# Patient Record
Sex: MALE | Race: WHITE | NOT HISPANIC OR LATINO | Employment: UNEMPLOYED | URBAN - METROPOLITAN AREA
[De-identification: names, ages, dates, MRNs, and addresses within clinical notes are randomized per-mention and may not be internally consistent; named-entity substitution may affect disease eponyms.]

---

## 2017-03-27 ENCOUNTER — HISTORICAL (OUTPATIENT)
Dept: ADMINISTRATIVE | Facility: HOSPITAL | Age: 57
End: 2017-03-27

## 2017-03-27 LAB
ABS NEUT (OLG): 5.23 X10(3)/MCL (ref 2.1–9.2)
ALBUMIN SERPL-MCNC: 4 GM/DL (ref 3.4–5)
ALBUMIN/GLOB SERPL: 1 {RATIO}
ALP SERPL-CCNC: 73 UNIT/L (ref 20–120)
ALT SERPL-CCNC: 52 UNIT/L
APPEARANCE, UA: CLEAR
AST SERPL-CCNC: 37 UNIT/L
BACTERIA #/AREA URNS AUTO: ABNORMAL /[HPF]
BASOPHILS # BLD AUTO: 0.06 X10(3)/MCL
BASOPHILS NFR BLD AUTO: 1 % (ref 0–1)
BILIRUB SERPL-MCNC: 0.9 MG/DL
BILIRUB UR QL STRIP: NEGATIVE
BILIRUBIN DIRECT+TOT PNL SERPL-MCNC: 0.1 MG/DL
BILIRUBIN DIRECT+TOT PNL SERPL-MCNC: 0.8 MG/DL
BUN SERPL-MCNC: 17 MG/DL (ref 7–25)
CALCIUM SERPL-MCNC: 9.5 MG/DL (ref 8.4–10.3)
CHLORIDE SERPL-SCNC: 100 MMOL/L (ref 96–110)
CHOLEST SERPL-MCNC: 267 MG/DL
CHOLEST/HDLC SERPL: 7.2 {RATIO} (ref 0–5)
CO2 SERPL-SCNC: 35 MMOL/L (ref 24–32)
COLOR UR: YELLOW
CREAT SERPL-MCNC: 1.13 MG/DL (ref 0.7–1.4)
CREAT UR-MCNC: 309.1 MG/DL
EOSINOPHIL # BLD AUTO: 0.36 X10(3)/MCL
EOSINOPHIL NFR BLD AUTO: 4 % (ref 0–5)
ERYTHROCYTE [DISTWIDTH] IN BLOOD BY AUTOMATED COUNT: 14.8 % (ref 11.5–14.5)
EST. AVERAGE GLUCOSE BLD GHB EST-MCNC: 143 MG/DL
GLOBULIN SER-MCNC: 3.7 GM/ML (ref 2.3–3.5)
GLUCOSE (UA): NORMAL
GLUCOSE SERPL-MCNC: 147 MG/DL (ref 65–99)
HBA1C MFR BLD: 6.6 % (ref 4.7–5.6)
HCT VFR BLD AUTO: 56.2 % (ref 40–51)
HDLC SERPL-MCNC: 37 MG/DL
HGB BLD-MCNC: 18.5 GM/DL (ref 13.5–17.5)
HGB UR QL STRIP: NEGATIVE
HYALINE CASTS #/AREA URNS LPF: ABNORMAL /[LPF]
IMM GRANULOCYTES # BLD AUTO: 0.14 10*3/UL
IMM GRANULOCYTES NFR BLD AUTO: 2 %
KETONES UR QL STRIP: NEGATIVE
LDLC SERPL CALC-MCNC: 160 MG/DL (ref 0–130)
LEUKOCYTE ESTERASE UR QL STRIP: 25 LEU/UL
LYMPHOCYTES # BLD AUTO: 2.76 X10(3)/MCL
LYMPHOCYTES NFR BLD AUTO: 30 % (ref 15–40)
MCH RBC QN AUTO: 29.2 PG (ref 26–34)
MCHC RBC AUTO-ENTMCNC: 32.9 GM/DL (ref 31–37)
MCV RBC AUTO: 88.8 FL (ref 80–100)
MICROALBUMIN UR-MCNC: 26.6 MG/L (ref 0–19)
MICROALBUMIN/CREAT RATIO PNL UR: 8.6 MCG/MG CR (ref 0–29)
MONOCYTES # BLD AUTO: 0.76 X10(3)/MCL
MONOCYTES NFR BLD AUTO: 8 % (ref 4–12)
NEUTROPHILS # BLD AUTO: 5.23 X10(3)/MCL
NEUTROPHILS NFR BLD AUTO: 56 % (ref 43–75)
NITRITE UR QL STRIP: NEGATIVE
PH UR STRIP: 6.5 [PH] (ref 4.5–8)
PLATELET # BLD AUTO: 227 X10(3)/MCL (ref 130–400)
PMV BLD AUTO: 9.1 FL (ref 7.4–10.4)
POTASSIUM SERPL-SCNC: 4.2 MMOL/L (ref 3.6–5.2)
PROT SERPL-MCNC: 7.7 GM/DL (ref 6–8)
PROT UR QL STRIP: 20 MG/DL
RBC # BLD AUTO: 6.33 X10(6)/MCL (ref 4.5–5.9)
RBC #/AREA URNS AUTO: ABNORMAL /[HPF]
SODIUM SERPL-SCNC: 142 MMOL/L (ref 135–146)
SP GR UR STRIP: 1.02 (ref 1–1.03)
SQUAMOUS #/AREA URNS LPF: ABNORMAL /[LPF]
TRIGL SERPL-MCNC: 348 MG/DL
UROBILINOGEN UR STRIP-ACNC: 2 MG/DL
VLDLC SERPL CALC-MCNC: 70 MG/DL
WBC # SPEC AUTO: 9.3 X10(3)/MCL (ref 4.5–11)
WBC #/AREA URNS AUTO: ABNORMAL /HPF

## 2017-04-11 ENCOUNTER — HISTORICAL (OUTPATIENT)
Dept: INTERNAL MEDICINE | Facility: CLINIC | Age: 57
End: 2017-04-11

## 2018-03-05 ENCOUNTER — HISTORICAL (OUTPATIENT)
Dept: ADMINISTRATIVE | Facility: HOSPITAL | Age: 58
End: 2018-03-05

## 2018-03-05 LAB
ABS NEUT (OLG): 6.1 X10(3)/MCL (ref 2.1–9.2)
ALBUMIN SERPL-MCNC: 3.6 GM/DL (ref 3.4–5)
ALBUMIN/GLOB SERPL: 1 RATIO (ref 1–2)
ALP SERPL-CCNC: 94 UNIT/L (ref 45–117)
ALT SERPL-CCNC: 42 UNIT/L (ref 12–78)
AST SERPL-CCNC: 28 UNIT/L (ref 15–37)
BASOPHILS # BLD AUTO: 0.08 X10(3)/MCL
BASOPHILS NFR BLD AUTO: 1 %
BILIRUB SERPL-MCNC: 0.4 MG/DL (ref 0.2–1)
BILIRUBIN DIRECT+TOT PNL SERPL-MCNC: 0.2 MG/DL
BILIRUBIN DIRECT+TOT PNL SERPL-MCNC: 0.2 MG/DL
BUN SERPL-MCNC: 12 MG/DL (ref 7–18)
CALCIUM SERPL-MCNC: 9 MG/DL (ref 8.5–10.1)
CHLORIDE SERPL-SCNC: 104 MMOL/L (ref 98–107)
CHOLEST SERPL-MCNC: 128 MG/DL
CHOLEST/HDLC SERPL: 3.4 {RATIO} (ref 0–5)
CO2 SERPL-SCNC: 30 MMOL/L (ref 21–32)
CREAT SERPL-MCNC: 1.1 MG/DL (ref 0.6–1.3)
EOSINOPHIL # BLD AUTO: 0.15 X10(3)/MCL
EOSINOPHIL NFR BLD AUTO: 2 %
ERYTHROCYTE [DISTWIDTH] IN BLOOD BY AUTOMATED COUNT: 15 % (ref 11.5–14.5)
EST. AVERAGE GLUCOSE BLD GHB EST-MCNC: 143 MG/DL
GLOBULIN SER-MCNC: 3.5 GM/ML (ref 2.3–3.5)
GLUCOSE SERPL-MCNC: 92 MG/DL (ref 74–106)
HBA1C MFR BLD: 6.6 % (ref 4.2–6.3)
HCT VFR BLD AUTO: 53.2 % (ref 40–51)
HDLC SERPL-MCNC: 38 MG/DL
HGB BLD-MCNC: 17.4 GM/DL (ref 13.5–17.5)
IMM GRANULOCYTES # BLD AUTO: 0.14 10*3/UL
IMM GRANULOCYTES NFR BLD AUTO: 1 %
LDLC SERPL CALC-MCNC: 54 MG/DL (ref 0–130)
LYMPHOCYTES # BLD AUTO: 2.42 X10(3)/MCL
LYMPHOCYTES NFR BLD AUTO: 25 % (ref 13–40)
MCH RBC QN AUTO: 29.8 PG (ref 26–34)
MCHC RBC AUTO-ENTMCNC: 32.7 GM/DL (ref 31–37)
MCV RBC AUTO: 91.3 FL (ref 80–100)
MONOCYTES # BLD AUTO: 0.93 X10(3)/MCL
MONOCYTES NFR BLD AUTO: 10 % (ref 4–12)
NEUTROPHILS # BLD AUTO: 6.1 X10(3)/MCL
NEUTROPHILS NFR BLD AUTO: 62 X10(3)/MCL
PLATELET # BLD AUTO: 244 X10(3)/MCL (ref 130–400)
PMV BLD AUTO: 9.7 FL (ref 7.4–10.4)
POTASSIUM SERPL-SCNC: 4 MMOL/L (ref 3.5–5.1)
PROT SERPL-MCNC: 7.1 GM/DL (ref 6.4–8.2)
RBC # BLD AUTO: 5.83 X10(6)/MCL (ref 4.5–5.9)
SODIUM SERPL-SCNC: 141 MMOL/L (ref 136–145)
TRIGL SERPL-MCNC: 181 MG/DL
VLDLC SERPL CALC-MCNC: 36 MG/DL
WBC # SPEC AUTO: 9.8 X10(3)/MCL (ref 4.5–11)

## 2018-03-08 ENCOUNTER — HISTORICAL (OUTPATIENT)
Dept: INTERNAL MEDICINE | Facility: CLINIC | Age: 58
End: 2018-03-08

## 2018-03-08 LAB
APPEARANCE, UA: CLEAR
BACTERIA #/AREA URNS AUTO: ABNORMAL /[HPF]
BILIRUB UR QL STRIP: NEGATIVE
COLOR UR: YELLOW
GLUCOSE (UA): NORMAL
HGB UR QL STRIP: NEGATIVE
HYALINE CASTS #/AREA URNS LPF: ABNORMAL /[LPF]
KETONES UR QL STRIP: ABNORMAL
LEUKOCYTE ESTERASE UR QL STRIP: 75 LEU/UL
NITRITE UR QL STRIP: NEGATIVE
PH UR STRIP: 5.5 [PH] (ref 4.5–8)
PROT UR QL STRIP: 10 MG/DL
RBC #/AREA URNS AUTO: ABNORMAL /[HPF]
SP GR UR STRIP: 1.01 (ref 1–1.03)
SQUAMOUS #/AREA URNS LPF: ABNORMAL /[LPF]
UROBILINOGEN UR STRIP-ACNC: 2 MG/DL
WBC #/AREA URNS AUTO: ABNORMAL /HPF

## 2018-03-12 ENCOUNTER — HISTORICAL (OUTPATIENT)
Dept: SLEEP MEDICINE | Facility: HOSPITAL | Age: 58
End: 2018-03-12

## 2018-05-11 ENCOUNTER — HISTORICAL (OUTPATIENT)
Dept: RADIOLOGY | Facility: HOSPITAL | Age: 58
End: 2018-05-11

## 2018-09-20 ENCOUNTER — HISTORICAL (OUTPATIENT)
Dept: INTERNAL MEDICINE | Facility: CLINIC | Age: 58
End: 2018-09-20

## 2018-09-25 ENCOUNTER — HISTORICAL (OUTPATIENT)
Dept: RADIOLOGY | Facility: HOSPITAL | Age: 58
End: 2018-09-25

## 2019-05-22 ENCOUNTER — HISTORICAL (OUTPATIENT)
Dept: ADMINISTRATIVE | Facility: HOSPITAL | Age: 59
End: 2019-05-22

## 2019-05-22 LAB
ABS NEUT (OLG): 8.79 X10(3)/MCL (ref 2.1–9.2)
BASOPHILS # BLD AUTO: 0.05 X10(3)/MCL
BASOPHILS NFR BLD AUTO: 0 %
EOSINOPHIL # BLD AUTO: 0.14 10*3/UL
EOSINOPHIL NFR BLD AUTO: 1 %
ERYTHROCYTE [DISTWIDTH] IN BLOOD BY AUTOMATED COUNT: 15.1 % (ref 11.5–14.5)
EST. AVERAGE GLUCOSE BLD GHB EST-MCNC: 148 MG/DL
HBA1C MFR BLD: 6.8 % (ref 4.2–6.3)
HCT VFR BLD AUTO: 52.7 % (ref 40–51)
HGB BLD-MCNC: 16.4 GM/DL (ref 13.5–17.5)
IMM GRANULOCYTES # BLD AUTO: 0.11 10*3/UL
IMM GRANULOCYTES NFR BLD AUTO: 1 %
LYMPHOCYTES # BLD AUTO: 2.26 X10(3)/MCL
LYMPHOCYTES NFR BLD AUTO: 18 % (ref 13–40)
MCH RBC QN AUTO: 27.6 PG (ref 26–34)
MCHC RBC AUTO-ENTMCNC: 31.1 GM/DL (ref 31–37)
MCV RBC AUTO: 88.6 FL (ref 80–100)
MONOCYTES # BLD AUTO: 0.95 X10(3)/MCL
MONOCYTES NFR BLD AUTO: 8 % (ref 4–12)
NEUTROPHILS # BLD AUTO: 8.79 X10(3)/MCL
NEUTROPHILS NFR BLD AUTO: 72 X10(3)/MCL
PLATELET # BLD AUTO: 297 X10(3)/MCL (ref 130–400)
PMV BLD AUTO: 9.6 FL (ref 7.4–10.4)
RBC # BLD AUTO: 5.95 X10(6)/MCL (ref 4.5–5.9)
WBC # SPEC AUTO: 12.3 X10(3)/MCL (ref 4.5–11)

## 2019-05-30 ENCOUNTER — HISTORICAL (OUTPATIENT)
Dept: RESPIRATORY THERAPY | Facility: HOSPITAL | Age: 59
End: 2019-05-30

## 2019-06-26 ENCOUNTER — HISTORICAL (OUTPATIENT)
Dept: SLEEP MEDICINE | Facility: HOSPITAL | Age: 59
End: 2019-06-26

## 2019-10-01 ENCOUNTER — HOSPITAL ENCOUNTER (OUTPATIENT)
Dept: MEDSURG UNIT | Facility: HOSPITAL | Age: 59
End: 2019-10-02
Attending: INTERNAL MEDICINE | Admitting: INTERNAL MEDICINE

## 2019-10-01 LAB
ALBUMIN SERPL-MCNC: 3.8 GM/DL (ref 3.4–5)
ALBUMIN/GLOB SERPL: 1 RATIO (ref 1.1–2)
ALP SERPL-CCNC: 108 UNIT/L (ref 45–117)
ALT SERPL-CCNC: 34 UNIT/L (ref 12–78)
AMPHET UR QL SCN: NEGATIVE
APPEARANCE, UA: ABNORMAL
AST SERPL-CCNC: 48 UNIT/L (ref 15–37)
BACTERIA #/AREA URNS AUTO: ABNORMAL /[HPF]
BARBITURATE SCN PRESENT UR: NEGATIVE
BASOPHILS NFR BLD MANUAL: 0 %
BENZODIAZ UR QL SCN: NEGATIVE
BILIRUB SERPL-MCNC: 0.6 MG/DL (ref 0.2–1)
BILIRUB UR QL STRIP: 0.5 MG/DL
BILIRUBIN DIRECT+TOT PNL SERPL-MCNC: 0.2 MG/DL (ref 0–0.2)
BILIRUBIN DIRECT+TOT PNL SERPL-MCNC: 0.4 MG/DL
BUN SERPL-MCNC: 56 MG/DL (ref 7–18)
CALCIUM SERPL-MCNC: 9.5 MG/DL (ref 8.5–10.1)
CANNABINOIDS UR QL SCN: POSITIVE
CHLORIDE SERPL-SCNC: 107 MMOL/L (ref 98–107)
CO2 SERPL-SCNC: 25 MMOL/L (ref 21–32)
COCAINE UR QL SCN: NEGATIVE
COLOR UR: YELLOW
CREAT SERPL-MCNC: 3 MG/DL (ref 0.6–1.3)
EOSINOPHIL NFR BLD MANUAL: 0 %
ERYTHROCYTE [DISTWIDTH] IN BLOOD BY AUTOMATED COUNT: 14.6 % (ref 11.5–14.5)
ETHANOL SERPL-MCNC: <3 MG/DL
GLOBULIN SER-MCNC: 3.7 GM/ML (ref 2.3–3.5)
GLUCOSE (UA): 30 MG/DL
GLUCOSE SERPL-MCNC: 142 MG/DL (ref 74–106)
GRANULOCYTES NFR BLD MANUAL: 66 % (ref 43–75)
HCT VFR BLD AUTO: 45.9 % (ref 40–51)
HGB BLD-MCNC: 15.6 GM/DL (ref 13.5–17.5)
HGB UR QL STRIP: NEGATIVE
HYALINE CASTS #/AREA URNS LPF: ABNORMAL /[LPF]
KETONES UR QL STRIP: ABNORMAL
LEUKOCYTE ESTERASE UR QL STRIP: 250 LEU/UL
LYMPHOCYTES NFR BLD MANUAL: 2 %
LYMPHOCYTES NFR BLD MANUAL: 20 % (ref 20.5–51.1)
MCH RBC QN AUTO: 29.8 PG (ref 26–34)
MCHC RBC AUTO-ENTMCNC: 34 GM/DL (ref 31–37)
MCV RBC AUTO: 87.6 FL (ref 80–100)
MONOCYTES NFR BLD MANUAL: 12 % (ref 2–9)
MUCOUS THREADS URNS QL MICRO: ABNORMAL
NITRITE UR QL STRIP: NEGATIVE
OPIATES UR QL SCN: NEGATIVE
PCP UR QL: NEGATIVE
PH UR STRIP.AUTO: 5.5 [PH] (ref 5–8)
PH UR STRIP: 5.5 [PH] (ref 4.5–8)
PLATELET # BLD AUTO: 272 X10(3)/MCL (ref 130–400)
PLATELET # BLD EST: ADEQUATE 10*3/UL
PMV BLD AUTO: 9.4 FL (ref 7.4–10.4)
POTASSIUM SERPL-SCNC: 4.8 MMOL/L (ref 3.5–5.1)
PROT SERPL-MCNC: 7.5 GM/DL (ref 6.4–8.2)
PROT UR QL STRIP: 70 MG/DL
RBC # BLD AUTO: 5.24 X10(6)/MCL (ref 4.5–5.9)
RBC #/AREA URNS AUTO: ABNORMAL /[HPF]
RBC MORPH BLD: NORMAL
SODIUM SERPL-SCNC: 140 MMOL/L (ref 136–145)
SP GR UR STRIP: 1.02 (ref 1–1.03)
SQUAMOUS #/AREA URNS LPF: ABNORMAL /[LPF]
TEMPERATURE, URINE (OHS): 25 DEGC (ref 20–25)
TSH SERPL-ACNC: 1.2 MIU/L (ref 0.36–3.74)
UROBILINOGEN UR STRIP-ACNC: 2 MG/DL
WBC # SPEC AUTO: 13.6 X10(3)/MCL (ref 4.5–11)
WBC #/AREA URNS AUTO: ABNORMAL /HPF

## 2019-10-02 LAB
ABS NEUT (OLG): 4.31 X10(3)/MCL (ref 2.1–9.2)
ALBUMIN SERPL-MCNC: 3 GM/DL (ref 3.4–5)
ALBUMIN/GLOB SERPL: 1.1 RATIO (ref 1.1–2)
ALP SERPL-CCNC: 82 UNIT/L (ref 45–117)
ALT SERPL-CCNC: 26 UNIT/L (ref 12–78)
AST SERPL-CCNC: 40 UNIT/L (ref 15–37)
BASOPHILS # BLD AUTO: 0 X10(3)/MCL (ref 0–0.2)
BASOPHILS NFR BLD AUTO: 0 %
BILIRUB SERPL-MCNC: 0.5 MG/DL (ref 0.2–1)
BILIRUBIN DIRECT+TOT PNL SERPL-MCNC: 0.2 MG/DL (ref 0–0.2)
BILIRUBIN DIRECT+TOT PNL SERPL-MCNC: 0.3 MG/DL
BUN SERPL-MCNC: 27 MG/DL (ref 7–18)
BUN SERPL-MCNC: 39 MG/DL (ref 7–18)
CALCIUM SERPL-MCNC: 8.3 MG/DL (ref 8.5–10.1)
CALCIUM SERPL-MCNC: 8.3 MG/DL (ref 8.5–10.1)
CHLORIDE SERPL-SCNC: 112 MMOL/L (ref 98–107)
CHLORIDE SERPL-SCNC: 114 MMOL/L (ref 98–107)
CO2 SERPL-SCNC: 26 MMOL/L (ref 21–32)
CO2 SERPL-SCNC: 27 MMOL/L (ref 21–32)
CREAT SERPL-MCNC: 1 MG/DL (ref 0.6–1.3)
CREAT SERPL-MCNC: 1.4 MG/DL (ref 0.6–1.3)
CREAT/UREA NIT SERPL: 27
EOSINOPHIL # BLD AUTO: 0.1 X10(3)/MCL (ref 0–0.9)
EOSINOPHIL NFR BLD AUTO: 2 %
ERYTHROCYTE [DISTWIDTH] IN BLOOD BY AUTOMATED COUNT: 14.7 % (ref 11.5–14.5)
GLOBULIN SER-MCNC: 2.8 GM/ML (ref 2.3–3.5)
GLUCOSE SERPL-MCNC: 112 MG/DL (ref 74–106)
GLUCOSE SERPL-MCNC: 86 MG/DL (ref 74–106)
HCT VFR BLD AUTO: 41.5 % (ref 40–51)
HGB BLD-MCNC: 13.5 GM/DL (ref 13.5–17.5)
IMM GRANULOCYTES # BLD AUTO: 0.07 10*3/UL
IMM GRANULOCYTES NFR BLD AUTO: 1 %
LYMPHOCYTES # BLD AUTO: 2.2 X10(3)/MCL (ref 0.6–4.6)
LYMPHOCYTES NFR BLD AUTO: 29 %
MCH RBC QN AUTO: 29.2 PG (ref 26–34)
MCHC RBC AUTO-ENTMCNC: 32.5 GM/DL (ref 31–37)
MCV RBC AUTO: 89.8 FL (ref 80–100)
MONOCYTES # BLD AUTO: 1 X10(3)/MCL (ref 0.1–1.3)
MONOCYTES NFR BLD AUTO: 12 %
NEUTROPHILS # BLD AUTO: 4.31 X10(3)/MCL (ref 2.1–9.2)
NEUTROPHILS NFR BLD AUTO: 56 %
PLATELET # BLD AUTO: 192 X10(3)/MCL (ref 130–400)
PMV BLD AUTO: 9.2 FL (ref 7.4–10.4)
POTASSIUM SERPL-SCNC: 3.9 MMOL/L (ref 3.5–5.1)
POTASSIUM SERPL-SCNC: 4.4 MMOL/L (ref 3.5–5.1)
PROT SERPL-MCNC: 5.8 GM/DL (ref 6.4–8.2)
RBC # BLD AUTO: 4.62 X10(6)/MCL (ref 4.5–5.9)
SODIUM SERPL-SCNC: 143 MMOL/L (ref 136–145)
SODIUM SERPL-SCNC: 144 MMOL/L (ref 136–145)
WBC # SPEC AUTO: 7.8 X10(3)/MCL (ref 4.5–11)

## 2019-12-30 ENCOUNTER — HISTORICAL (OUTPATIENT)
Dept: RADIOLOGY | Facility: HOSPITAL | Age: 59
End: 2019-12-30

## 2019-12-30 LAB
BUN SERPL-MCNC: 13 MG/DL (ref 7–18)
CALCIUM SERPL-MCNC: 9 MG/DL (ref 8.5–10.1)
CHLORIDE SERPL-SCNC: 103 MMOL/L (ref 98–107)
CO2 SERPL-SCNC: 30 MMOL/L (ref 21–32)
CREAT SERPL-MCNC: 0.8 MG/DL (ref 0.6–1.3)
CREAT/UREA NIT SERPL: 16
GLUCOSE SERPL-MCNC: 130 MG/DL (ref 74–106)
POTASSIUM SERPL-SCNC: 4 MMOL/L (ref 3.5–5.1)
SODIUM SERPL-SCNC: 138 MMOL/L (ref 136–145)

## 2020-01-03 ENCOUNTER — HISTORICAL (OUTPATIENT)
Dept: ADMINISTRATIVE | Facility: HOSPITAL | Age: 60
End: 2020-01-03

## 2020-01-03 LAB
CORTIS SERPL-SCNC: 8 MCG/DL
CREAT 24H UR-MCNC: 0.4 GM/24HR (ref 0.7–2.6)
CREAT UR-MCNC: 48 MG/DL

## 2020-01-10 ENCOUNTER — HISTORICAL (OUTPATIENT)
Dept: ADMINISTRATIVE | Facility: HOSPITAL | Age: 60
End: 2020-01-10

## 2020-01-10 LAB — CORTIS SERPL-SCNC: 10 MCG/DL

## 2020-02-17 ENCOUNTER — HISTORICAL (OUTPATIENT)
Dept: INTERNAL MEDICINE | Facility: CLINIC | Age: 60
End: 2020-02-17

## 2020-03-11 ENCOUNTER — HISTORICAL (OUTPATIENT)
Dept: ADMINISTRATIVE | Facility: HOSPITAL | Age: 60
End: 2020-03-11

## 2020-03-11 LAB
ABS NEUT (OLG): 7.01 X10(3)/MCL (ref 2.1–9.2)
ALBUMIN SERPL-MCNC: 3.6 GM/DL (ref 3.4–5)
ALBUMIN/GLOB SERPL: 1 RATIO (ref 1.1–2)
ALP SERPL-CCNC: 109 UNIT/L (ref 45–117)
ALT SERPL-CCNC: 30 UNIT/L (ref 12–78)
AST SERPL-CCNC: 12 UNIT/L (ref 15–37)
BASOPHILS # BLD AUTO: 0 X10(3)/MCL (ref 0–0.2)
BASOPHILS NFR BLD AUTO: 0 %
BILIRUB SERPL-MCNC: 0.4 MG/DL (ref 0.2–1)
BILIRUBIN DIRECT+TOT PNL SERPL-MCNC: 0.2 MG/DL
BILIRUBIN DIRECT+TOT PNL SERPL-MCNC: 0.2 MG/DL (ref 0–0.2)
BUN SERPL-MCNC: 10 MG/DL (ref 7–18)
CALCIUM SERPL-MCNC: 9.2 MG/DL (ref 8.5–10.1)
CHLORIDE SERPL-SCNC: 99 MMOL/L (ref 98–107)
CO2 SERPL-SCNC: 34 MMOL/L (ref 21–32)
CREAT SERPL-MCNC: 1 MG/DL (ref 0.6–1.3)
DEPRECATED CALCIDIOL+CALCIFEROL SERPL-MC: 26.5 NG/ML (ref 30–80)
EOSINOPHIL # BLD AUTO: 0.1 X10(3)/MCL (ref 0–0.9)
EOSINOPHIL NFR BLD AUTO: 2 %
ERYTHROCYTE [DISTWIDTH] IN BLOOD BY AUTOMATED COUNT: 14.6 % (ref 11.5–14.5)
EST. AVERAGE GLUCOSE BLD GHB EST-MCNC: 157 MG/DL
GLOBULIN SER-MCNC: 3.6 GM/ML (ref 2.3–3.5)
GLUCOSE SERPL-MCNC: 193 MG/DL (ref 74–106)
HAV IGM SERPL QL IA: NONREACTIVE
HBA1C MFR BLD: 7.1 % (ref 4.2–6.3)
HBV CORE IGM SERPL QL IA: NONREACTIVE
HBV SURFACE AG SERPL QL IA: NONREACTIVE
HCT VFR BLD AUTO: 49 % (ref 40–51)
HCV AB SERPL QL IA: NONREACTIVE
HGB BLD-MCNC: 15.8 GM/DL (ref 13.5–17.5)
IMM GRANULOCYTES # BLD AUTO: 0.04 10*3/UL
IMM GRANULOCYTES NFR BLD AUTO: 0 %
LYMPHOCYTES # BLD AUTO: 1.6 X10(3)/MCL (ref 0.6–4.6)
LYMPHOCYTES NFR BLD AUTO: 16 %
MCH RBC QN AUTO: 28.7 PG (ref 26–34)
MCHC RBC AUTO-ENTMCNC: 32.2 GM/DL (ref 31–37)
MCV RBC AUTO: 88.9 FL (ref 80–100)
MONOCYTES # BLD AUTO: 0.9 X10(3)/MCL (ref 0.1–1.3)
MONOCYTES NFR BLD AUTO: 9 %
NEUTROPHILS # BLD AUTO: 7.01 X10(3)/MCL (ref 2.1–9.2)
NEUTROPHILS NFR BLD AUTO: 73 %
PLATELET # BLD AUTO: 257 X10(3)/MCL (ref 130–400)
PMV BLD AUTO: 9 FL (ref 7.4–10.4)
POTASSIUM SERPL-SCNC: 3.8 MMOL/L (ref 3.5–5.1)
PROT SERPL-MCNC: 7.2 GM/DL (ref 6.4–8.2)
RBC # BLD AUTO: 5.51 X10(6)/MCL (ref 4.5–5.9)
SODIUM SERPL-SCNC: 136 MMOL/L (ref 136–145)
WBC # SPEC AUTO: 9.6 X10(3)/MCL (ref 4.5–11)

## 2020-06-04 ENCOUNTER — HISTORICAL (OUTPATIENT)
Dept: ADMINISTRATIVE | Facility: HOSPITAL | Age: 60
End: 2020-06-04

## 2020-06-08 ENCOUNTER — HISTORICAL (OUTPATIENT)
Dept: SURGERY | Facility: HOSPITAL | Age: 60
End: 2020-06-08

## 2020-06-21 ENCOUNTER — HOSPITAL ENCOUNTER (OUTPATIENT)
Dept: MEDSURG UNIT | Facility: HOSPITAL | Age: 60
End: 2020-06-23
Attending: INTERNAL MEDICINE | Admitting: INTERNAL MEDICINE

## 2020-06-21 LAB
ABS NEUT (OLG): 7.3 X10(3)/MCL (ref 2.1–9.2)
ABS NEUT (OLG): 7.32 X10(3)/MCL (ref 2.1–9.2)
ALBUMIN SERPL-MCNC: 2.8 GM/DL (ref 3.4–5)
ALBUMIN SERPL-MCNC: 2.9 GM/DL (ref 3.4–5)
ALBUMIN/GLOB SERPL: 0.7 RATIO (ref 1.1–2)
ALBUMIN/GLOB SERPL: 0.8 RATIO (ref 1.1–2)
ALP SERPL-CCNC: 119 UNIT/L (ref 45–117)
ALP SERPL-CCNC: 137 UNIT/L (ref 45–117)
ALT SERPL-CCNC: 19 UNIT/L (ref 12–78)
ALT SERPL-CCNC: 20 UNIT/L (ref 12–78)
APTT PPP: 26.6 SECOND(S) (ref 23.3–37)
AST SERPL-CCNC: 10 UNIT/L (ref 15–37)
AST SERPL-CCNC: 9 UNIT/L (ref 15–37)
BASOPHILS # BLD AUTO: 0.1 X10(3)/MCL (ref 0–0.2)
BASOPHILS # BLD AUTO: 0.1 X10(3)/MCL (ref 0–0.2)
BASOPHILS NFR BLD AUTO: 1 %
BASOPHILS NFR BLD AUTO: 1 %
BILIRUB SERPL-MCNC: 0.2 MG/DL (ref 0.2–1)
BILIRUB SERPL-MCNC: 0.3 MG/DL (ref 0.2–1)
BILIRUBIN DIRECT+TOT PNL SERPL-MCNC: 0.1 MG/DL
BILIRUBIN DIRECT+TOT PNL SERPL-MCNC: 0.1 MG/DL (ref 0–0.2)
BILIRUBIN DIRECT+TOT PNL SERPL-MCNC: 0.1 MG/DL (ref 0–0.2)
BILIRUBIN DIRECT+TOT PNL SERPL-MCNC: 0.2 MG/DL
BUN SERPL-MCNC: 10 MG/DL (ref 7–18)
BUN SERPL-MCNC: 9 MG/DL (ref 7–18)
CALCIUM SERPL-MCNC: 8.7 MG/DL (ref 8.5–10.1)
CALCIUM SERPL-MCNC: 9 MG/DL (ref 8.5–10.1)
CHLORIDE SERPL-SCNC: 100 MMOL/L (ref 98–107)
CHLORIDE SERPL-SCNC: 98 MMOL/L (ref 98–107)
CO2 SERPL-SCNC: 31 MMOL/L (ref 21–32)
CO2 SERPL-SCNC: 35 MMOL/L (ref 21–32)
CREAT SERPL-MCNC: 0.7 MG/DL (ref 0.6–1.3)
CREAT SERPL-MCNC: 1 MG/DL (ref 0.6–1.3)
EOSINOPHIL # BLD AUTO: 0.2 X10(3)/MCL (ref 0–0.9)
EOSINOPHIL # BLD AUTO: 0.2 X10(3)/MCL (ref 0–0.9)
EOSINOPHIL NFR BLD AUTO: 2 %
EOSINOPHIL NFR BLD AUTO: 2 %
ERYTHROCYTE [DISTWIDTH] IN BLOOD BY AUTOMATED COUNT: 15.3 % (ref 11.5–14.5)
ERYTHROCYTE [DISTWIDTH] IN BLOOD BY AUTOMATED COUNT: 15.4 % (ref 11.5–14.5)
GLOBULIN SER-MCNC: 3.6 GM/ML (ref 2.3–3.5)
GLOBULIN SER-MCNC: 4 GM/ML (ref 2.3–3.5)
GLUCOSE SERPL-MCNC: 138 MG/DL (ref 74–106)
GLUCOSE SERPL-MCNC: 233 MG/DL (ref 74–106)
HCO3 UR-SCNC: 37.9 MMOL/L (ref 22–26)
HCT VFR BLD AUTO: 46.5 % (ref 40–51)
HCT VFR BLD AUTO: 47.7 % (ref 40–51)
HGB BLD-MCNC: 14.4 GM/DL (ref 13.5–17.5)
HGB BLD-MCNC: 15.1 GM/DL (ref 13.5–17.5)
IMM GRANULOCYTES # BLD AUTO: 0.19 10*3/UL
IMM GRANULOCYTES # BLD AUTO: 0.19 10*3/UL
IMM GRANULOCYTES NFR BLD AUTO: 2 %
IMM GRANULOCYTES NFR BLD AUTO: 2 %
INR PPP: 0.96 (ref 0.9–1.2)
LACTATE SERPL-SCNC: 1.6 MMOL/L (ref 0.4–2)
LACTATE SERPL-SCNC: 2.5 MMOL/L (ref 0.4–2)
LYMPHOCYTES # BLD AUTO: 1.8 X10(3)/MCL (ref 0.6–4.6)
LYMPHOCYTES # BLD AUTO: 1.9 X10(3)/MCL (ref 0.6–4.6)
LYMPHOCYTES NFR BLD AUTO: 17 %
LYMPHOCYTES NFR BLD AUTO: 18 %
MAGNESIUM SERPL-MCNC: 1.5 MG/DL (ref 1.6–2.6)
MCH RBC QN AUTO: 28.3 PG (ref 26–34)
MCH RBC QN AUTO: 28.5 PG (ref 26–34)
MCHC RBC AUTO-ENTMCNC: 31 GM/DL (ref 31–37)
MCHC RBC AUTO-ENTMCNC: 31.7 GM/DL (ref 31–37)
MCV RBC AUTO: 90.2 FL (ref 80–100)
MCV RBC AUTO: 91.5 FL (ref 80–100)
MONOCYTES # BLD AUTO: 0.9 X10(3)/MCL (ref 0.1–1.3)
MONOCYTES # BLD AUTO: 1 X10(3)/MCL (ref 0.1–1.3)
MONOCYTES NFR BLD AUTO: 8 %
MONOCYTES NFR BLD AUTO: 9 %
NEUTROPHILS # BLD AUTO: 7.3 X10(3)/MCL (ref 2.1–9.2)
NEUTROPHILS # BLD AUTO: 7.32 X10(3)/MCL (ref 2.1–9.2)
NEUTROPHILS NFR BLD AUTO: 69 %
NEUTROPHILS NFR BLD AUTO: 69 %
O2 HGB ARTERIAL: 84.3 % (ref 94–100)
PCO2 BLDA: 67 MMHG (ref 35–45)
PH SMN: 7.36 [PH] (ref 7.35–7.45)
PHOSPHATE SERPL-MCNC: 3.6 MG/DL (ref 2.5–4.9)
PLATELET # BLD AUTO: 291 X10(3)/MCL (ref 130–400)
PLATELET # BLD AUTO: 324 X10(3)/MCL (ref 130–400)
PMV BLD AUTO: 9 FL (ref 7.4–10.4)
PMV BLD AUTO: 9.1 FL (ref 7.4–10.4)
PO2 BLDA: 54 MMHG (ref 80–100)
POC ALLENS TEST: POSITIVE
POC BE: 9.5 (ref -2–2)
POC CO HGB: 6.4 %
POC CO2: 40 MMOL/L (ref 22–26)
POC MET HGB: 0.9 % (ref 0.4–1.5)
POC SAMPLESOURCE: ABNORMAL
POC SATURATED O2: 90.9 %
POC SITE: ABNORMAL
POC THB: 14.5 GM/DL (ref 12–16)
POC TREATMENT: ABNORMAL
POTASSIUM SERPL-SCNC: 4.1 MMOL/L (ref 3.5–5.1)
POTASSIUM SERPL-SCNC: 4.5 MMOL/L (ref 3.5–5.1)
PROT SERPL-MCNC: 6.4 GM/DL (ref 6.4–8.2)
PROT SERPL-MCNC: 6.9 GM/DL (ref 6.4–8.2)
PROTHROMBIN TIME: 12.4 SECOND(S) (ref 11.9–14.4)
RBC # BLD AUTO: 5.08 X10(6)/MCL (ref 4.5–5.9)
RBC # BLD AUTO: 5.29 X10(6)/MCL (ref 4.5–5.9)
SARS-COV-2 AG RESP QL IA.RAPID: NEGATIVE
SODIUM SERPL-SCNC: 138 MMOL/L (ref 136–145)
SODIUM SERPL-SCNC: 140 MMOL/L (ref 136–145)
WBC # SPEC AUTO: 10.6 X10(3)/MCL (ref 4.5–11)
WBC # SPEC AUTO: 10.6 X10(3)/MCL (ref 4.5–11)

## 2020-06-22 LAB
ABS NEUT (OLG): 9.95 X10(3)/MCL (ref 2.1–9.2)
ALBUMIN SERPL-MCNC: 2.8 GM/DL (ref 3.4–5)
ALBUMIN/GLOB SERPL: 0.7 RATIO (ref 1.1–2)
ALP SERPL-CCNC: 131 UNIT/L (ref 45–117)
ALT SERPL-CCNC: 20 UNIT/L (ref 12–78)
AST SERPL-CCNC: 9 UNIT/L (ref 15–37)
BASOPHILS # BLD AUTO: 0.1 X10(3)/MCL (ref 0–0.2)
BASOPHILS NFR BLD AUTO: 0 %
BILIRUB SERPL-MCNC: 0.3 MG/DL (ref 0.2–1)
BILIRUBIN DIRECT+TOT PNL SERPL-MCNC: <0.1 MG/DL (ref 0–0.2)
BILIRUBIN DIRECT+TOT PNL SERPL-MCNC: ABNORMAL MG/DL
BUN SERPL-MCNC: 10 MG/DL (ref 7–18)
CALCIUM SERPL-MCNC: 8.8 MG/DL (ref 8.5–10.1)
CHLORIDE SERPL-SCNC: 101 MMOL/L (ref 98–107)
CO2 SERPL-SCNC: 35 MMOL/L (ref 21–32)
CREAT SERPL-MCNC: 0.7 MG/DL (ref 0.6–1.3)
EOSINOPHIL # BLD AUTO: 0.1 X10(3)/MCL (ref 0–0.9)
EOSINOPHIL NFR BLD AUTO: 1 %
ERYTHROCYTE [DISTWIDTH] IN BLOOD BY AUTOMATED COUNT: 15.4 % (ref 11.5–14.5)
GLOBULIN SER-MCNC: 4 GM/ML (ref 2.3–3.5)
GLUCOSE SERPL-MCNC: 202 MG/DL (ref 74–106)
HCT VFR BLD AUTO: 46.5 % (ref 40–51)
HGB BLD-MCNC: 14.5 GM/DL (ref 13.5–17.5)
IMM GRANULOCYTES # BLD AUTO: 0.14 10*3/UL
IMM GRANULOCYTES NFR BLD AUTO: 1 %
LYMPHOCYTES # BLD AUTO: 1.1 X10(3)/MCL (ref 0.6–4.6)
LYMPHOCYTES NFR BLD AUTO: 9 %
MAGNESIUM SERPL-MCNC: 2 MG/DL (ref 1.6–2.6)
MCH RBC QN AUTO: 28.4 PG (ref 26–34)
MCHC RBC AUTO-ENTMCNC: 31.2 GM/DL (ref 31–37)
MCV RBC AUTO: 91 FL (ref 80–100)
MONOCYTES # BLD AUTO: 0.6 X10(3)/MCL (ref 0.1–1.3)
MONOCYTES NFR BLD AUTO: 5 %
NEUTROPHILS # BLD AUTO: 9.95 X10(3)/MCL (ref 2.1–9.2)
NEUTROPHILS NFR BLD AUTO: 83 %
PHOSPHATE SERPL-MCNC: 4.6 MG/DL (ref 2.5–4.9)
PLATELET # BLD AUTO: 287 X10(3)/MCL (ref 130–400)
PMV BLD AUTO: 8.9 FL (ref 7.4–10.4)
POTASSIUM SERPL-SCNC: 4.6 MMOL/L (ref 3.5–5.1)
PROT SERPL-MCNC: 6.8 GM/DL (ref 6.4–8.2)
RBC # BLD AUTO: 5.11 X10(6)/MCL (ref 4.5–5.9)
SODIUM SERPL-SCNC: 138 MMOL/L (ref 136–145)
WBC # SPEC AUTO: 12 X10(3)/MCL (ref 4.5–11)

## 2020-06-23 LAB
APPEARANCE, UA: CLEAR
BACTERIA #/AREA URNS AUTO: ABNORMAL /HPF
BILIRUB UR QL STRIP: NEGATIVE
COLOR UR: COLORLESS
GLUCOSE (UA): NEGATIVE
HGB UR QL STRIP: NEGATIVE
HYALINE CASTS #/AREA URNS LPF: ABNORMAL /LPF
KETONES UR QL STRIP: NEGATIVE
LEUKOCYTE ESTERASE UR QL STRIP: NEGATIVE
NITRITE UR QL STRIP: NEGATIVE
PH UR STRIP: 8 [PH] (ref 4.5–8)
PROT UR QL STRIP: NEGATIVE
RBC #/AREA URNS AUTO: ABNORMAL /HPF
SP GR UR STRIP: 1 (ref 1–1.03)
SQUAMOUS #/AREA URNS LPF: ABNORMAL /LPF
UROBILINOGEN UR STRIP-ACNC: NORMAL
WBC #/AREA URNS AUTO: ABNORMAL /HPF

## 2020-06-26 LAB
FINAL CULTURE: NORMAL
FINAL CULTURE: NORMAL

## 2020-07-09 ENCOUNTER — HISTORICAL (OUTPATIENT)
Dept: ADMINISTRATIVE | Facility: HOSPITAL | Age: 60
End: 2020-07-09

## 2020-07-09 ENCOUNTER — HISTORICAL (OUTPATIENT)
Dept: LAB | Facility: HOSPITAL | Age: 60
End: 2020-07-09

## 2020-07-09 LAB — CORTIS SERPL-SCNC: 15.1 UG/DL

## 2020-07-13 ENCOUNTER — HISTORICAL (OUTPATIENT)
Dept: ADMINISTRATIVE | Facility: HOSPITAL | Age: 60
End: 2020-07-13

## 2020-07-13 LAB
CORTIS 1H P CHAL SERPL-SCNC: 20.5 UG/DL
CORTIS 30M P CHAL SERPL-SCNC: 19.5 UG/DL
CORTIS SERPL-SCNC: 15.1 UG/DL

## 2020-08-05 ENCOUNTER — HISTORICAL (OUTPATIENT)
Dept: ADMINISTRATIVE | Facility: HOSPITAL | Age: 60
End: 2020-08-05

## 2020-08-05 LAB — CORTIS SERPL-SCNC: 1.1 UG/DL

## 2020-09-01 ENCOUNTER — HISTORICAL (OUTPATIENT)
Dept: RADIOLOGY | Facility: HOSPITAL | Age: 60
End: 2020-09-01

## 2020-09-01 LAB — CREAT SERPL-MCNC: 1 MG/DL (ref 0.6–1.3)

## 2020-09-25 ENCOUNTER — HISTORICAL (OUTPATIENT)
Dept: INTERNAL MEDICINE | Facility: CLINIC | Age: 60
End: 2020-09-25

## 2020-09-25 LAB
ABS NEUT (OLG): 4.36 X10(3)/MCL (ref 2.1–9.2)
ALBUMIN SERPL-MCNC: 3.5 GM/DL (ref 3.4–5)
ALBUMIN/GLOB SERPL: 0.9 RATIO (ref 1.1–2)
ALP SERPL-CCNC: 115 UNIT/L (ref 45–117)
ALT SERPL-CCNC: 20 UNIT/L (ref 12–78)
AST SERPL-CCNC: 10 UNIT/L (ref 15–37)
BASOPHILS # BLD AUTO: 0 X10(3)/MCL (ref 0–0.2)
BASOPHILS NFR BLD AUTO: 0 %
BILIRUB SERPL-MCNC: 0.3 MG/DL (ref 0.2–1)
BILIRUBIN DIRECT+TOT PNL SERPL-MCNC: 0.1 MG/DL (ref 0–0.2)
BILIRUBIN DIRECT+TOT PNL SERPL-MCNC: 0.2 MG/DL
BUN SERPL-MCNC: 20 MG/DL (ref 7–18)
CALCIUM SERPL-MCNC: 9.8 MG/DL (ref 8.5–10.1)
CHLORIDE SERPL-SCNC: 102 MMOL/L (ref 98–107)
CO2 SERPL-SCNC: 31 MMOL/L (ref 21–32)
CREAT SERPL-MCNC: 0.8 MG/DL (ref 0.6–1.3)
EOSINOPHIL # BLD AUTO: 0.3 X10(3)/MCL (ref 0–0.9)
EOSINOPHIL NFR BLD AUTO: 4 %
ERYTHROCYTE [DISTWIDTH] IN BLOOD BY AUTOMATED COUNT: 14.6 % (ref 11.5–14.5)
EST. AVERAGE GLUCOSE BLD GHB EST-MCNC: 151 MG/DL
GLOBULIN SER-MCNC: 4.1 GM/ML (ref 2.3–3.5)
GLUCOSE SERPL-MCNC: 128 MG/DL (ref 74–106)
HBA1C MFR BLD: 6.9 % (ref 4.2–6.3)
HCT VFR BLD AUTO: 47.8 % (ref 40–51)
HGB BLD-MCNC: 15.1 GM/DL (ref 13.5–17.5)
IMM GRANULOCYTES # BLD AUTO: 0.07 10*3/UL
IMM GRANULOCYTES NFR BLD AUTO: 1 %
LYMPHOCYTES # BLD AUTO: 1.7 X10(3)/MCL (ref 0.6–4.6)
LYMPHOCYTES NFR BLD AUTO: 23 %
MCH RBC QN AUTO: 28.5 PG (ref 26–34)
MCHC RBC AUTO-ENTMCNC: 31.6 GM/DL (ref 31–37)
MCV RBC AUTO: 90.2 FL (ref 80–100)
MONOCYTES # BLD AUTO: 1 X10(3)/MCL (ref 0.1–1.3)
MONOCYTES NFR BLD AUTO: 13 %
NEUTROPHILS # BLD AUTO: 4.36 X10(3)/MCL (ref 2.1–9.2)
NEUTROPHILS NFR BLD AUTO: 58 %
PLATELET # BLD AUTO: 234 X10(3)/MCL (ref 130–400)
PMV BLD AUTO: 8.5 FL (ref 7.4–10.4)
POTASSIUM SERPL-SCNC: 4.8 MMOL/L (ref 3.5–5.1)
PROT SERPL-MCNC: 7.6 GM/DL (ref 6.4–8.2)
RBC # BLD AUTO: 5.3 X10(6)/MCL (ref 4.5–5.9)
SODIUM SERPL-SCNC: 139 MMOL/L (ref 136–145)
WBC # SPEC AUTO: 7.5 X10(3)/MCL (ref 4.5–11)

## 2021-01-26 ENCOUNTER — HISTORICAL (OUTPATIENT)
Dept: ADMINISTRATIVE | Facility: HOSPITAL | Age: 61
End: 2021-01-26

## 2021-01-26 LAB
DEPRECATED CALCIDIOL+CALCIFEROL SERPL-MC: 26.9 NG/ML (ref 30–80)
EST. AVERAGE GLUCOSE BLD GHB EST-MCNC: 148.5 MG/DL
HBA1C MFR BLD: 6.8 %

## 2021-03-09 ENCOUNTER — HISTORICAL (OUTPATIENT)
Dept: RADIOLOGY | Facility: HOSPITAL | Age: 61
End: 2021-03-09

## 2021-03-09 LAB — CREAT SERPL-MCNC: 0.8 MG/DL (ref 0.73–1.18)

## 2021-04-08 ENCOUNTER — HISTORICAL (OUTPATIENT)
Dept: ADMINISTRATIVE | Facility: HOSPITAL | Age: 61
End: 2021-04-08

## 2021-04-12 ENCOUNTER — HISTORICAL (OUTPATIENT)
Dept: CARDIOLOGY | Facility: HOSPITAL | Age: 61
End: 2021-04-12

## 2021-04-22 ENCOUNTER — HISTORICAL (OUTPATIENT)
Dept: RADIOLOGY | Facility: HOSPITAL | Age: 61
End: 2021-04-22

## 2021-06-23 ENCOUNTER — HISTORICAL (OUTPATIENT)
Dept: RADIOLOGY | Facility: HOSPITAL | Age: 61
End: 2021-06-23

## 2021-08-03 ENCOUNTER — HISTORICAL (OUTPATIENT)
Dept: INTERNAL MEDICINE | Facility: CLINIC | Age: 61
End: 2021-08-03

## 2021-08-03 LAB
ABS NEUT (OLG): 6.08 X10(3)/MCL (ref 2.1–9.2)
ALBUMIN SERPL-MCNC: 3.6 GM/DL (ref 3.5–5)
ALBUMIN/GLOB SERPL: 1 RATIO (ref 1.1–2)
ALP SERPL-CCNC: 143 UNIT/L (ref 40–150)
ALT SERPL-CCNC: 13 UNIT/L (ref 0–55)
ANISOCYTOSIS BLD QL SMEAR: NORMAL
AST SERPL-CCNC: 14 UNIT/L (ref 5–34)
BASOPHILS # BLD AUTO: 0 X10(3)/MCL (ref 0–0.2)
BASOPHILS NFR BLD AUTO: 0 %
BILIRUB SERPL-MCNC: 0.6 MG/DL
BILIRUBIN DIRECT+TOT PNL SERPL-MCNC: 0.3 MG/DL (ref 0–0.5)
BILIRUBIN DIRECT+TOT PNL SERPL-MCNC: 0.3 MG/DL (ref 0–0.8)
BUN SERPL-MCNC: 6.7 MG/DL (ref 8.4–25.7)
CALCIUM SERPL-MCNC: 9.8 MG/DL (ref 8.4–10.2)
CHLORIDE SERPL-SCNC: 96 MMOL/L (ref 98–107)
CO2 SERPL-SCNC: 35 MMOL/L (ref 22–29)
CREAT SERPL-MCNC: 0.71 MG/DL (ref 0.73–1.18)
EOSINOPHIL # BLD AUTO: 0.2 X10(3)/MCL (ref 0–0.9)
EOSINOPHIL NFR BLD AUTO: 2 %
ERYTHROCYTE [DISTWIDTH] IN BLOOD BY AUTOMATED COUNT: 20.5 % (ref 11.5–14.5)
GLOBULIN SER-MCNC: 3.7 GM/DL (ref 2.4–3.5)
GLUCOSE SERPL-MCNC: 107 MG/DL (ref 74–100)
HCT VFR BLD AUTO: 55.8 % (ref 40–51)
HGB BLD-MCNC: 16.1 GM/DL (ref 13.5–17.5)
IMM GRANULOCYTES # BLD AUTO: 0.03 10*3/UL
IMM GRANULOCYTES NFR BLD AUTO: 0 %
LYMPHOCYTES # BLD AUTO: 1.9 X10(3)/MCL (ref 0.6–4.6)
LYMPHOCYTES NFR BLD AUTO: 21 %
MACROCYTES BLD QL SMEAR: NORMAL
MAGNESIUM SERPL-MCNC: 1.5 MG/DL (ref 1.6–2.6)
MCH RBC QN AUTO: 22.2 PG (ref 26–34)
MCHC RBC AUTO-ENTMCNC: 28.9 GM/DL (ref 31–37)
MCV RBC AUTO: 77 FL (ref 80–100)
MICROCYTES BLD QL SMEAR: NORMAL
MONOCYTES # BLD AUTO: 0.9 X10(3)/MCL (ref 0.1–1.3)
MONOCYTES NFR BLD AUTO: 10 %
NEUTROPHILS # BLD AUTO: 6.08 X10(3)/MCL (ref 2.1–9.2)
NEUTROPHILS NFR BLD AUTO: 66 %
NRBC BLD AUTO-RTO: 0 % (ref 0–0.2)
PHOSPHATE SERPL-MCNC: 3.5 MG/DL (ref 2.3–4.7)
PLATELET # BLD AUTO: 243 X10(3)/MCL (ref 130–400)
PLATELET # BLD EST: ADEQUATE 10*3/UL
PMV BLD AUTO: 8.6 FL (ref 7.4–10.4)
POLYCHROMASIA BLD QL SMEAR: NORMAL
POTASSIUM SERPL-SCNC: 3.9 MMOL/L (ref 3.5–5.1)
PROT SERPL-MCNC: 7.3 GM/DL (ref 6.4–8.3)
RBC # BLD AUTO: 7.25 X10(6)/MCL (ref 4.5–5.9)
SODIUM SERPL-SCNC: 137 MMOL/L (ref 136–145)
WBC # SPEC AUTO: 9.1 X10(3)/MCL (ref 4.5–11)

## 2021-08-10 ENCOUNTER — HISTORICAL (OUTPATIENT)
Dept: CARDIOLOGY | Facility: HOSPITAL | Age: 61
End: 2021-08-10

## 2021-08-10 LAB
APTT PPP: 29.7 SECOND(S) (ref 23.3–37)
INR PPP: 1.02 (ref 0.9–1.2)
PROTHROMBIN TIME: 13.2 SECOND(S) (ref 11.9–14.4)

## 2021-08-11 ENCOUNTER — HISTORICAL (OUTPATIENT)
Dept: CARDIOLOGY | Facility: HOSPITAL | Age: 61
End: 2021-08-11

## 2021-08-11 LAB
CHOLEST SERPL-MCNC: 95 MG/DL
CHOLEST/HDLC SERPL: 4 {RATIO} (ref 0–5)
HDLC SERPL-MCNC: 23 MG/DL (ref 35–60)
LDLC SERPL CALC-MCNC: 57 MG/DL (ref 50–140)
TRIGL SERPL-MCNC: 76 MG/DL (ref 34–140)
VLDLC SERPL CALC-MCNC: 15 MG/DL

## 2021-09-28 ENCOUNTER — HISTORICAL (OUTPATIENT)
Dept: ADMINISTRATIVE | Facility: HOSPITAL | Age: 61
End: 2021-09-28

## 2021-09-28 LAB
CREAT UR-MCNC: 181.3 MG/DL (ref 58–161)
DEPRECATED CALCIDIOL+CALCIFEROL SERPL-MC: 41 NG/ML (ref 30–80)
EST. AVERAGE GLUCOSE BLD GHB EST-MCNC: 122.6 MG/DL
HBA1C MFR BLD: 5.9 %
MICROALBUMIN UR-MCNC: 55.5 MG/L
MICROALBUMIN/CREAT RATIO PNL UR: 30.6 MG/GM CR (ref 0–30)

## 2021-10-18 ENCOUNTER — HISTORICAL (OUTPATIENT)
Dept: RESPIRATORY THERAPY | Facility: HOSPITAL | Age: 61
End: 2021-10-18

## 2021-10-18 LAB
HCO3 UR-SCNC: 27.2 MMOL/L (ref 22–26)
O2 HGB ARTERIAL: 71.7 % (ref 94–100)
PCO2 BLDA: 54 MMHG (ref 35–45)
PH SMN: 7.31 [PH] (ref 7.35–7.45)
PO2 BLDA: 49 MMHG (ref 80–100)
POC ALLENS TEST: POSITIVE
POC BE: -0.3 (ref -2–2)
POC CO HGB: 21.1 %
POC CO2: 28.9 MMOL/L (ref 22–26)
POC MET HGB: 0.3 % (ref 0.4–1.5)
POC SAMPLESOURCE: ABNORMAL
POC SATURATED O2: 91.2 %
POC SITE: ABNORMAL
POC THB: 16.1 GM/DL (ref 12–16)
POC TREATMENT: ABNORMAL

## 2021-10-30 ENCOUNTER — HOSPITAL ENCOUNTER (OUTPATIENT)
Dept: MEDSURG UNIT | Facility: HOSPITAL | Age: 61
End: 2021-11-03
Attending: INTERNAL MEDICINE | Admitting: INTERNAL MEDICINE

## 2021-10-30 LAB
ABS NEUT (OLG): 5.86 X10(3)/MCL (ref 2.1–9.2)
ALBUMIN SERPL-MCNC: 3.6 GM/DL (ref 3.5–5)
ALBUMIN/GLOB SERPL: 1 RATIO (ref 1.1–2)
ALP SERPL-CCNC: 133 UNIT/L (ref 40–150)
ALT SERPL-CCNC: 17 UNIT/L (ref 0–55)
AST SERPL-CCNC: 17 UNIT/L (ref 5–34)
BASOPHILS # BLD AUTO: 0 X10(3)/MCL (ref 0–0.2)
BASOPHILS NFR BLD AUTO: 1 %
BILIRUB SERPL-MCNC: 0.4 MG/DL
BILIRUBIN DIRECT+TOT PNL SERPL-MCNC: 0.2 MG/DL (ref 0–0.5)
BILIRUBIN DIRECT+TOT PNL SERPL-MCNC: 0.2 MG/DL (ref 0–0.8)
BNP BLD-MCNC: 13.6 PG/ML
BUN SERPL-MCNC: 7.8 MG/DL (ref 8.4–25.7)
CALCIUM SERPL-MCNC: 10.1 MG/DL (ref 8.7–10.5)
CHLORIDE SERPL-SCNC: 97 MMOL/L (ref 98–107)
CO2 SERPL-SCNC: 29 MMOL/L (ref 22–29)
CREAT SERPL-MCNC: 1.04 MG/DL (ref 0.73–1.18)
EOSINOPHIL # BLD AUTO: 0.2 X10(3)/MCL (ref 0–0.9)
EOSINOPHIL NFR BLD AUTO: 2 %
ERYTHROCYTE [DISTWIDTH] IN BLOOD BY AUTOMATED COUNT: 21.4 % (ref 11.5–14.5)
GLOBULIN SER-MCNC: 3.6 GM/DL (ref 2.4–3.5)
GLUCOSE SERPL-MCNC: 193 MG/DL (ref 74–100)
HCO3 UR-SCNC: 40 MMOL/L (ref 22–26)
HCT VFR BLD AUTO: 56.3 % (ref 40–51)
HGB BLD-MCNC: 16.9 GM/DL (ref 13.5–17.5)
IMM GRANULOCYTES # BLD AUTO: 0.03 10*3/UL
IMM GRANULOCYTES NFR BLD AUTO: 0 %
LACTATE SERPL-SCNC: 1.7 MMOL/L (ref 0.5–2.2)
LYMPHOCYTES # BLD AUTO: 1.6 X10(3)/MCL (ref 0.6–4.6)
LYMPHOCYTES NFR BLD AUTO: 20 %
MCH RBC QN AUTO: 22.5 PG (ref 26–34)
MCHC RBC AUTO-ENTMCNC: 30 GM/DL (ref 31–37)
MCV RBC AUTO: 75 FL (ref 80–100)
MONOCYTES # BLD AUTO: 0.6 X10(3)/MCL (ref 0.1–1.3)
MONOCYTES NFR BLD AUTO: 8 %
NEUTROPHILS # BLD AUTO: 5.86 X10(3)/MCL (ref 2.1–9.2)
NEUTROPHILS NFR BLD AUTO: 70 %
NRBC BLD AUTO-RTO: 0 % (ref 0–0.2)
O2 HGB ARTERIAL: 87.4 % (ref 94–100)
PCO2 BLDA: 66 MMHG (ref 35–45)
PH SMN: 7.39 [PH] (ref 7.35–7.45)
PLATELET # BLD AUTO: 251 X10(3)/MCL (ref 130–400)
PMV BLD AUTO: 9.1 FL (ref 7.4–10.4)
PO2 BLDA: 70 MMHG (ref 80–100)
POC ALLENS TEST: POSITIVE
POC BE: 11.3 (ref -2–2)
POC CO HGB: 7.2 %
POC CO2: 42 MMOL/L (ref 22–26)
POC MET HGB: 0.8 % (ref 0.4–1.5)
POC SAMPLESOURCE: ABNORMAL
POC SATURATED O2: 95 %
POC SITE: ABNORMAL
POC THB: 17 GM/DL (ref 12–16)
POC TREATMENT: ABNORMAL
POTASSIUM SERPL-SCNC: 3.4 MMOL/L (ref 3.5–5.1)
PROT SERPL-MCNC: 7.2 GM/DL (ref 6.4–8.3)
RBC # BLD AUTO: 7.51 X10(6)/MCL (ref 4.5–5.9)
SARS-COV-2 AG RESP QL IA.RAPID: NEGATIVE
SODIUM SERPL-SCNC: 140 MMOL/L (ref 136–145)
TROPONIN I SERPL-MCNC: <0.01 NG/ML (ref 0–0.04)
WBC # SPEC AUTO: 8.4 X10(3)/MCL (ref 4.5–11)

## 2021-10-31 LAB
ABS NEUT (OLG): 6.67 X10(3)/MCL (ref 2.1–9.2)
ALBUMIN SERPL-MCNC: 2.8 GM/DL (ref 3.5–5)
ALBUMIN SERPL-MCNC: 3.4 GM/DL (ref 3.5–5)
ALBUMIN/GLOB SERPL: 0.9 RATIO (ref 1.1–2)
ALBUMIN/GLOB SERPL: 1 RATIO (ref 1.1–2)
ALP SERPL-CCNC: 121 UNIT/L (ref 40–150)
ALP SERPL-CCNC: 94 UNIT/L (ref 40–150)
ALT SERPL-CCNC: 13 UNIT/L (ref 0–55)
ALT SERPL-CCNC: 17 UNIT/L (ref 0–55)
AST SERPL-CCNC: 11 UNIT/L (ref 5–34)
AST SERPL-CCNC: 12 UNIT/L (ref 5–34)
BASOPHILS # BLD AUTO: 0 X10(3)/MCL (ref 0–0.2)
BASOPHILS NFR BLD AUTO: 0 %
BILIRUB SERPL-MCNC: 0.2 MG/DL
BILIRUB SERPL-MCNC: 0.4 MG/DL
BILIRUBIN DIRECT+TOT PNL SERPL-MCNC: 0.1 MG/DL (ref 0–0.5)
BILIRUBIN DIRECT+TOT PNL SERPL-MCNC: 0.1 MG/DL (ref 0–0.8)
BILIRUBIN DIRECT+TOT PNL SERPL-MCNC: 0.2 MG/DL (ref 0–0.5)
BILIRUBIN DIRECT+TOT PNL SERPL-MCNC: 0.2 MG/DL (ref 0–0.8)
BUN SERPL-MCNC: 11.9 MG/DL (ref 8.4–25.7)
BUN SERPL-MCNC: 12.4 MG/DL (ref 8.4–25.7)
CALCIUM SERPL-MCNC: 7 MG/DL (ref 8.7–10.5)
CALCIUM SERPL-MCNC: 9.9 MG/DL (ref 8.7–10.5)
CHLORIDE SERPL-SCNC: 78 MMOL/L (ref 98–107)
CHLORIDE SERPL-SCNC: 99 MMOL/L (ref 98–107)
CO2 SERPL-SCNC: 21 MMOL/L (ref 22–29)
CO2 SERPL-SCNC: 30 MMOL/L (ref 22–29)
CREAT SERPL-MCNC: 0.76 MG/DL (ref 0.73–1.18)
CREAT SERPL-MCNC: 0.77 MG/DL (ref 0.73–1.18)
ERYTHROCYTE [DISTWIDTH] IN BLOOD BY AUTOMATED COUNT: 21 % (ref 11.5–14.5)
GLOBULIN SER-MCNC: 2.8 GM/DL (ref 2.4–3.5)
GLOBULIN SER-MCNC: 3.6 GM/DL (ref 2.4–3.5)
GLUCOSE SERPL-MCNC: 194 MG/DL (ref 74–100)
GLUCOSE SERPL-MCNC: 205 MG/DL (ref 74–100)
HCT VFR BLD AUTO: 54.3 % (ref 40–51)
HGB BLD-MCNC: 16 GM/DL (ref 13.5–17.5)
IMM GRANULOCYTES # BLD AUTO: 0.04 10*3/UL
IMM GRANULOCYTES NFR BLD AUTO: 1 %
LYMPHOCYTES # BLD AUTO: 0.4 X10(3)/MCL (ref 0.6–4.6)
LYMPHOCYTES NFR BLD AUTO: 6 %
MCH RBC QN AUTO: 22.6 PG (ref 26–34)
MCHC RBC AUTO-ENTMCNC: 29.5 GM/DL (ref 31–37)
MCV RBC AUTO: 76.6 FL (ref 80–100)
MONOCYTES # BLD AUTO: 0.1 X10(3)/MCL (ref 0.1–1.3)
MONOCYTES NFR BLD AUTO: 1 %
NEUTROPHILS # BLD AUTO: 6.67 X10(3)/MCL (ref 2.1–9.2)
NEUTROPHILS NFR BLD AUTO: 92 %
NRBC BLD AUTO-RTO: 0 % (ref 0–0.2)
PLATELET # BLD AUTO: 235 X10(3)/MCL (ref 130–400)
PMV BLD AUTO: 9.6 FL (ref 7.4–10.4)
POTASSIUM SERPL-SCNC: 4 MMOL/L (ref 3.5–5.1)
POTASSIUM SERPL-SCNC: 4.4 MMOL/L (ref 3.5–5.1)
PROT SERPL-MCNC: 5.6 GM/DL (ref 6.4–8.3)
PROT SERPL-MCNC: 7 GM/DL (ref 6.4–8.3)
RBC # BLD AUTO: 7.09 X10(6)/MCL (ref 4.5–5.9)
SODIUM SERPL-SCNC: 140 MMOL/L (ref 136–145)
SODIUM SERPL-SCNC: 167 MMOL/L (ref 136–145)
WBC # SPEC AUTO: 7.2 X10(3)/MCL (ref 4.5–11)

## 2021-11-01 LAB
ABS NEUT (OLG): 17.66 X10(3)/MCL (ref 2.1–9.2)
ABS NEUT (OLG): 19.07 X10(3)/MCL (ref 2.1–9.2)
ALBUMIN SERPL-MCNC: 3.3 GM/DL (ref 3.5–5)
ALBUMIN/GLOB SERPL: 0.9 RATIO (ref 1.1–2)
ALP SERPL-CCNC: 117 UNIT/L (ref 40–150)
ALT SERPL-CCNC: 17 UNIT/L (ref 0–55)
AST SERPL-CCNC: 13 UNIT/L (ref 5–34)
BASOPHILS # BLD AUTO: 0 X10(3)/MCL (ref 0–0.2)
BASOPHILS # BLD AUTO: 0 X10(3)/MCL (ref 0–0.2)
BASOPHILS NFR BLD AUTO: 0 %
BASOPHILS NFR BLD AUTO: 0 %
BILIRUB SERPL-MCNC: 0.4 MG/DL
BILIRUBIN DIRECT+TOT PNL SERPL-MCNC: 0.2 MG/DL (ref 0–0.5)
BILIRUBIN DIRECT+TOT PNL SERPL-MCNC: 0.2 MG/DL (ref 0–0.8)
BUN SERPL-MCNC: 15.1 MG/DL (ref 8.4–25.7)
CALCIUM SERPL-MCNC: 10 MG/DL (ref 8.7–10.5)
CHLORIDE SERPL-SCNC: 98 MMOL/L (ref 98–107)
CO2 SERPL-SCNC: 34 MMOL/L (ref 22–29)
CREAT SERPL-MCNC: 0.67 MG/DL (ref 0.73–1.18)
EOSINOPHIL # BLD AUTO: 0 X10(3)/MCL (ref 0–0.9)
EOSINOPHIL NFR BLD AUTO: 0 %
ERYTHROCYTE [DISTWIDTH] IN BLOOD BY AUTOMATED COUNT: 21.3 % (ref 11.5–14.5)
ERYTHROCYTE [DISTWIDTH] IN BLOOD BY AUTOMATED COUNT: 21.5 % (ref 11.5–14.5)
GLOBULIN SER-MCNC: 3.6 GM/DL (ref 2.4–3.5)
GLUCOSE SERPL-MCNC: 145 MG/DL (ref 74–100)
HCT VFR BLD AUTO: 55.9 % (ref 40–51)
HCT VFR BLD AUTO: 57.6 % (ref 40–51)
HGB BLD-MCNC: 15.8 GM/DL (ref 13.5–17.5)
HGB BLD-MCNC: 15.9 GM/DL (ref 13.5–17.5)
IMM GRANULOCYTES # BLD AUTO: 0.1 10*3/UL
IMM GRANULOCYTES # BLD AUTO: 0.2 10*3/UL
IMM GRANULOCYTES NFR BLD AUTO: 0 %
IMM GRANULOCYTES NFR BLD AUTO: 1 %
LYMPHOCYTES # BLD AUTO: 0.6 X10(3)/MCL (ref 0.6–4.6)
LYMPHOCYTES # BLD AUTO: 0.7 X10(3)/MCL (ref 0.6–4.6)
LYMPHOCYTES NFR BLD AUTO: 3 %
LYMPHOCYTES NFR BLD AUTO: 4 %
MCH RBC QN AUTO: 22.2 PG (ref 26–34)
MCH RBC QN AUTO: 22.4 PG (ref 26–34)
MCHC RBC AUTO-ENTMCNC: 27.4 GM/DL (ref 31–37)
MCHC RBC AUTO-ENTMCNC: 28.4 GM/DL (ref 31–37)
MCV RBC AUTO: 78.6 FL (ref 80–100)
MCV RBC AUTO: 81 FL (ref 80–100)
MONOCYTES # BLD AUTO: 0.6 X10(3)/MCL (ref 0.1–1.3)
MONOCYTES # BLD AUTO: 1.1 X10(3)/MCL (ref 0.1–1.3)
MONOCYTES NFR BLD AUTO: 3 %
MONOCYTES NFR BLD AUTO: 5 %
NEUTROPHILS # BLD AUTO: 17.66 X10(3)/MCL (ref 2.1–9.2)
NEUTROPHILS # BLD AUTO: 19.07 X10(3)/MCL (ref 2.1–9.2)
NEUTROPHILS NFR BLD AUTO: 90 %
NEUTROPHILS NFR BLD AUTO: 93 %
NRBC BLD AUTO-RTO: 0 % (ref 0–0.2)
NRBC BLD AUTO-RTO: 0 % (ref 0–0.2)
PLATELET # BLD AUTO: 249 X10(3)/MCL (ref 130–400)
PLATELET # BLD AUTO: 262 X10(3)/MCL (ref 130–400)
PMV BLD AUTO: 9 FL (ref 7.4–10.4)
PMV BLD AUTO: 9.2 FL (ref 7.4–10.4)
POTASSIUM SERPL-SCNC: 4.9 MMOL/L (ref 3.5–5.1)
PROT SERPL-MCNC: 6.9 GM/DL (ref 6.4–8.3)
RBC # BLD AUTO: 7.11 X10(6)/MCL (ref 4.5–5.9)
RBC # BLD AUTO: 7.11 X10(6)/MCL (ref 4.5–5.9)
SODIUM SERPL-SCNC: 140 MMOL/L (ref 136–145)
WBC # SPEC AUTO: 19 X10(3)/MCL (ref 4.5–11)
WBC # SPEC AUTO: 21.1 X10(3)/MCL (ref 4.5–11)

## 2021-11-02 LAB
ABS NEUT (OLG): 14.69 X10(3)/MCL (ref 2.1–9.2)
ALBUMIN SERPL-MCNC: 3.1 GM/DL (ref 3.5–5)
ALBUMIN/GLOB SERPL: 0.9 RATIO (ref 1.1–2)
ALP SERPL-CCNC: 100 UNIT/L (ref 40–150)
ALT SERPL-CCNC: 14 UNIT/L (ref 0–55)
AST SERPL-CCNC: 11 UNIT/L (ref 5–34)
BASOPHILS # BLD AUTO: 0 X10(3)/MCL (ref 0–0.2)
BASOPHILS NFR BLD AUTO: 0 %
BILIRUB SERPL-MCNC: 0.3 MG/DL
BILIRUBIN DIRECT+TOT PNL SERPL-MCNC: 0.1 MG/DL (ref 0–0.8)
BILIRUBIN DIRECT+TOT PNL SERPL-MCNC: 0.2 MG/DL (ref 0–0.5)
BUN SERPL-MCNC: 13.9 MG/DL (ref 8.4–25.7)
CALCIUM SERPL-MCNC: 10 MG/DL (ref 8.7–10.5)
CHLORIDE SERPL-SCNC: 96 MMOL/L (ref 98–107)
CO2 SERPL-SCNC: 35 MMOL/L (ref 22–29)
CREAT SERPL-MCNC: 0.69 MG/DL (ref 0.73–1.18)
ERYTHROCYTE [DISTWIDTH] IN BLOOD BY AUTOMATED COUNT: 21.2 % (ref 11.5–14.5)
GLOBULIN SER-MCNC: 3.3 GM/DL (ref 2.4–3.5)
GLUCOSE SERPL-MCNC: 159 MG/DL (ref 74–100)
HCT VFR BLD AUTO: 55.7 % (ref 40–51)
HGB BLD-MCNC: 16 GM/DL (ref 13.5–17.5)
IMM GRANULOCYTES # BLD AUTO: 0.11 10*3/UL
IMM GRANULOCYTES NFR BLD AUTO: 1 %
LYMPHOCYTES # BLD AUTO: 0.7 X10(3)/MCL (ref 0.6–4.6)
LYMPHOCYTES NFR BLD AUTO: 4 %
MCH RBC QN AUTO: 23 PG (ref 26–34)
MCHC RBC AUTO-ENTMCNC: 28.7 GM/DL (ref 31–37)
MCV RBC AUTO: 79.9 FL (ref 80–100)
MONOCYTES # BLD AUTO: 0.8 X10(3)/MCL (ref 0.1–1.3)
MONOCYTES NFR BLD AUTO: 5 %
NEUTROPHILS # BLD AUTO: 14.69 X10(3)/MCL (ref 2.1–9.2)
NEUTROPHILS NFR BLD AUTO: 90 %
NRBC BLD AUTO-RTO: 0 % (ref 0–0.2)
PLATELET # BLD AUTO: 232 X10(3)/MCL (ref 130–400)
PMV BLD AUTO: 8.8 FL (ref 7.4–10.4)
POTASSIUM SERPL-SCNC: 4.6 MMOL/L (ref 3.5–5.1)
PROT SERPL-MCNC: 6.4 GM/DL (ref 6.4–8.3)
RBC # BLD AUTO: 6.97 X10(6)/MCL (ref 4.5–5.9)
SODIUM SERPL-SCNC: 139 MMOL/L (ref 136–145)
WBC # SPEC AUTO: 16.3 X10(3)/MCL (ref 4.5–11)

## 2021-11-03 LAB
ABS NEUT (OLG): 11 X10(3)/MCL (ref 2.1–9.2)
ALBUMIN SERPL-MCNC: 3.2 GM/DL (ref 3.5–5)
ALBUMIN/GLOB SERPL: 1 RATIO (ref 1.1–2)
ALP SERPL-CCNC: 98 UNIT/L (ref 40–150)
ALT SERPL-CCNC: 14 UNIT/L (ref 0–55)
AST SERPL-CCNC: 11 UNIT/L (ref 5–34)
BASOPHILS # BLD AUTO: 0 X10(3)/MCL (ref 0–0.2)
BASOPHILS NFR BLD AUTO: 0 %
BILIRUB SERPL-MCNC: 0.4 MG/DL
BILIRUBIN DIRECT+TOT PNL SERPL-MCNC: 0.2 MG/DL (ref 0–0.5)
BILIRUBIN DIRECT+TOT PNL SERPL-MCNC: 0.2 MG/DL (ref 0–0.8)
BUN SERPL-MCNC: 13.1 MG/DL (ref 8.4–25.7)
CALCIUM SERPL-MCNC: 9.8 MG/DL (ref 8.7–10.5)
CHLORIDE SERPL-SCNC: 95 MMOL/L (ref 98–107)
CO2 SERPL-SCNC: 33 MMOL/L (ref 22–29)
CREAT SERPL-MCNC: 0.59 MG/DL (ref 0.73–1.18)
ERYTHROCYTE [DISTWIDTH] IN BLOOD BY AUTOMATED COUNT: 21.2 % (ref 11.5–14.5)
GLOBULIN SER-MCNC: 3.2 GM/DL (ref 2.4–3.5)
GLUCOSE SERPL-MCNC: 154 MG/DL (ref 74–100)
HCT VFR BLD AUTO: 53.8 % (ref 40–51)
HGB BLD-MCNC: 16.1 GM/DL (ref 13.5–17.5)
IMM GRANULOCYTES # BLD AUTO: 0.07 10*3/UL
IMM GRANULOCYTES NFR BLD AUTO: 0 %
LYMPHOCYTES # BLD AUTO: 0.8 X10(3)/MCL (ref 0.6–4.6)
LYMPHOCYTES NFR BLD AUTO: 6 %
MCH RBC QN AUTO: 22.9 PG (ref 26–34)
MCHC RBC AUTO-ENTMCNC: 29.9 GM/DL (ref 31–37)
MCV RBC AUTO: 76.4 FL (ref 80–100)
MONOCYTES # BLD AUTO: 1 X10(3)/MCL (ref 0.1–1.3)
MONOCYTES NFR BLD AUTO: 7 %
NEUTROPHILS # BLD AUTO: 11 X10(3)/MCL (ref 2.1–9.2)
NEUTROPHILS NFR BLD AUTO: 86 %
NRBC BLD AUTO-RTO: 0 % (ref 0–0.2)
PLATELET # BLD AUTO: 228 X10(3)/MCL (ref 130–400)
PMV BLD AUTO: 9.3 FL (ref 7.4–10.4)
POTASSIUM SERPL-SCNC: 4.4 MMOL/L (ref 3.5–5.1)
PROT SERPL-MCNC: 6.4 GM/DL (ref 6.4–8.3)
RBC # BLD AUTO: 7.04 X10(6)/MCL (ref 4.5–5.9)
SODIUM SERPL-SCNC: 139 MMOL/L (ref 136–145)
WBC # SPEC AUTO: 12.8 X10(3)/MCL (ref 4.5–11)

## 2021-11-04 LAB
FINAL CULTURE: NORMAL
FINAL CULTURE: NORMAL

## 2021-11-12 ENCOUNTER — HISTORICAL (OUTPATIENT)
Dept: ADMINISTRATIVE | Facility: HOSPITAL | Age: 61
End: 2021-11-12

## 2021-11-12 ENCOUNTER — HISTORICAL (OUTPATIENT)
Dept: LAB | Facility: HOSPITAL | Age: 61
End: 2021-11-12

## 2021-11-12 LAB
ABS NEUT (OLG): 7.92 X10(3)/MCL (ref 2.1–9.2)
ALBUMIN SERPL-MCNC: 3.4 GM/DL (ref 3.5–5)
ALBUMIN/GLOB SERPL: 1 RATIO (ref 1.1–2)
ALP SERPL-CCNC: 101 UNIT/L (ref 40–150)
ALT SERPL-CCNC: 29 UNIT/L (ref 0–55)
APTT PPP: 24.4 SECOND(S) (ref 23.3–37)
AST SERPL-CCNC: 15 UNIT/L (ref 5–34)
BASOPHILS # BLD AUTO: 0.1 X10(3)/MCL (ref 0–0.2)
BASOPHILS NFR BLD AUTO: 1 %
BILIRUB SERPL-MCNC: 0.5 MG/DL
BILIRUBIN DIRECT+TOT PNL SERPL-MCNC: 0.2 MG/DL (ref 0–0.5)
BILIRUBIN DIRECT+TOT PNL SERPL-MCNC: 0.3 MG/DL (ref 0–0.8)
BUN SERPL-MCNC: 11.6 MG/DL (ref 8.4–25.7)
CALCIUM SERPL-MCNC: 10 MG/DL (ref 8.7–10.5)
CHLORIDE SERPL-SCNC: 97 MMOL/L (ref 98–107)
CO2 SERPL-SCNC: 33 MMOL/L (ref 22–29)
CREAT SERPL-MCNC: 0.81 MG/DL (ref 0.73–1.18)
EOSINOPHIL # BLD AUTO: 0.2 X10(3)/MCL (ref 0–0.9)
EOSINOPHIL NFR BLD AUTO: 1 %
ERYTHROCYTE [DISTWIDTH] IN BLOOD BY AUTOMATED COUNT: 22.5 % (ref 11.5–14.5)
GLOBULIN SER-MCNC: 3.3 GM/DL (ref 2.4–3.5)
GLUCOSE SERPL-MCNC: 126 MG/DL (ref 74–100)
HCT VFR BLD AUTO: 55.2 % (ref 40–51)
HGB BLD-MCNC: 16.8 GM/DL (ref 13.5–17.5)
IMM GRANULOCYTES # BLD AUTO: 0.23 10*3/UL
IMM GRANULOCYTES NFR BLD AUTO: 2 %
INR PPP: 1 (ref 0.9–1.2)
LYMPHOCYTES # BLD AUTO: 2.7 X10(3)/MCL (ref 0.6–4.6)
LYMPHOCYTES NFR BLD AUTO: 23 %
MCH RBC QN AUTO: 23.3 PG (ref 26–34)
MCHC RBC AUTO-ENTMCNC: 30.4 GM/DL (ref 31–37)
MCV RBC AUTO: 76.7 FL (ref 80–100)
MONOCYTES # BLD AUTO: 0.8 X10(3)/MCL (ref 0.1–1.3)
MONOCYTES NFR BLD AUTO: 7 %
NEUTROPHILS # BLD AUTO: 7.92 X10(3)/MCL (ref 2.1–9.2)
NEUTROPHILS NFR BLD AUTO: 66 %
NRBC BLD AUTO-RTO: 0 % (ref 0–0.2)
PLATELET # BLD AUTO: 241 X10(3)/MCL (ref 130–400)
PLATELET # BLD EST: ADEQUATE 10*3/UL
PMV BLD AUTO: 8.7 FL (ref 7.4–10.4)
POTASSIUM SERPL-SCNC: 4.1 MMOL/L (ref 3.5–5.1)
PROT SERPL-MCNC: 6.7 GM/DL (ref 6.4–8.3)
PROTHROMBIN TIME: 13 SECOND(S) (ref 11.9–14.4)
RBC # BLD AUTO: 7.2 X10(6)/MCL (ref 4.5–5.9)
RBC MORPH BLD: NORMAL
SARS-COV-2 AG RESP QL IA.RAPID: NEGATIVE
SODIUM SERPL-SCNC: 138 MMOL/L (ref 136–145)
WBC # SPEC AUTO: 11.9 X10(3)/MCL (ref 4.5–11)

## 2022-04-11 ENCOUNTER — HISTORICAL (OUTPATIENT)
Dept: ADMINISTRATIVE | Facility: HOSPITAL | Age: 62
End: 2022-04-11
Payer: MEDICAID

## 2022-04-29 VITALS
HEIGHT: 67 IN | SYSTOLIC BLOOD PRESSURE: 98 MMHG | DIASTOLIC BLOOD PRESSURE: 62 MMHG | BODY MASS INDEX: 29.65 KG/M2 | OXYGEN SATURATION: 95 % | WEIGHT: 188.94 LBS

## 2022-04-30 NOTE — ED PROVIDER NOTES
Patient:   Benjie Patterson             MRN: [id number]            FIN: [id number]               Age:   60 years     Sex:  Male     :  1960   Associated Diagnoses:   COPD exacerbation; Hypoxemia   Author:   Ann Marie Thomas DO      Basic Information   Time seen: Date & time 10/30/2021 15:30:00 Date & time 10/30/2021 16:19:00 .   History source: Patient.   Arrival mode: Private vehicle.   History limitation: None.   Additional information: Chief Complaint from Nursing Triage Note : Chief Complaint   10/30/2021 15:28 CDT     Chief Complaint           c/o sob and non-productive cough that started yesterday. o2 86% RA. Denies chest pain.  .   Provider/Visit info:   Time Seen:  Solo Dumont MD / 10/30/2021 15:26  .   History of Present Illness   The patient presents with difficulty breathing and cough.  The onset was Since january.  The course/duration of symptoms is constant.  Degree at onset moderate.  Degree at present moderate.  The Exacerbating factors is Ventral hernia, 2 vessel CAD.  There are Relieving factorss including oxygen and beta-agonist.  Risk factors consist of coronary artery disease, diabetes mellitus, hypertension, chronic obstructive pulmonary disease and smoking.  Prior episodes: frequent.  Therapy today: none.  Associated symptoms: cough, denies chest pain, denies fever, denies chills, denies nausea, denies vomiting, denies weight gain and denies hemoptysis.  60 year old male presented to the ED complaining of shortness of breath which has started in the beginning of the year when he had ventral abdominal hernia on the left side.He states that he uses home oxygen at home but is  and is requesting for a portable oxygen tank.Patient is on 4L NC sating at 94%.  Patient stated that he had to go see a cardiologist to have the hernia surgery done but they found stenosis on 2 vessels, proximal LAD and proximal RCA. Patient is scheduled to do the stents on  in [facility]  [place]. Patient is a smoker and has suspicion for COPD exacerbations. Patient denied any chest pain, fever, chills, nausea, vomiting, abdominal pain and any weakness.        Review of Systems   Constitutional symptoms:  No fever, no chills, no sweats, no weakness.    Skin symptoms:  No rash, no petechiae.    Respiratory symptoms:  Shortness of breath, cough, no hemoptysis, no wheezing.    Cardiovascular symptoms:  No chest pain, no palpitations, no syncope, no diaphoresis.    Gastrointestinal symptoms:  No abdominal pain, no nausea, no vomiting, no diarrhea.    Musculoskeletal symptoms:  No Muscle pain,    Neurologic symptoms:  No headache, no dizziness, no altered level of consciousness.              Additional review of systems information: All other systems reviewed and otherwise negative.      Health Status   Allergies:    Allergic Reactions (All)  No Known Medication Allergies  .      Past Medical/ Family/ Social History   Medical history:    Active  Essential hypertension (39073956)  COPD - Chronic obstructive pulmonary disease (967160528)  CAD - Coronary artery disease (9919090962)  .   Social history: Alcohol use: Denies, Tobacco use: Regularly, Drug use: Denies.      Physical Examination               Vital Signs   Vital Signs   10/30/2021 16:10 CDT     Peripheral Pulse Rate     98 bpm                             Heart Rate Monitored      98 bpm                             Respiratory Rate          21 br/min                             SpO2                      93 %  LOW                             Oxygen Therapy            Nasal cannula                             Oxygen Flow Rate          4 L/min                             Systolic Blood Pressure   127 mmHg                             Diastolic Blood Pressure  94 mmHg  HI    10/30/2021 15:45 CDT     SpO2                      93 %  LOW                             Oxygen Therapy            Nasal cannula                             Oxygen Flow Rate           4 L/min    10/30/2021 15:28 CDT     Temperature Temporal Artery               36.8 DegC                             Peripheral Pulse Rate     112 bpm  HI                             Respiratory Rate          28 br/min  HI                             SpO2                      86 %  LOW                             Oxygen Therapy            Room air                             Systolic Blood Pressure   118 mmHg                             Diastolic Blood Pressure  80 mmHg  .   Oxygen saturation.   General:  Alert, mild distress.    Skin:  Warm, dry, pink, intact, no pallor, no rash.    Head:  Normocephalic, atraumatic.    Neck:  Supple, trachea midline, no JVD, no carotid bruit.    Eye:  Pupils are equal, round and reactive to light, normal conjunctiva.    Ears, nose, mouth and throat:  Oral mucosa moist.   Cardiovascular:  Regular rate and rhythm, No murmur, Normal peripheral perfusion, No edema.    Respiratory:  Breath sounds are equal, Symmetrical chest wall expansion, Respirations: Tachypneic, respiratory distress mild, Breath sounds: Bilateral, diminished, Retractions: None.    Gastrointestinal:  Soft, Nontender, Non distended, Normal bowel sounds, No organomegaly, Large ventral hernia, reducible.    Back:  Nontender, Normal range of motion.    Musculoskeletal:  Normal ROM, normal strength, no swelling.    Neurological:  Alert and oriented to person, place, time, and situation, No focal neurological deficit observed, normal motor observed, normal speech observed, normal coordination observed.    Psychiatric:  Cooperative, appropriate mood & affect.       Medical Decision Making   Differential Diagnosis:  Pneumonia, congestive heart failure, chronic obstructive pulmonary disease.    Documents reviewed:  Emergency department nurses' notes, emergency department records, prior records.    Electrocardiogram:  Time 10/30/2021 16:35:00, rate 84, normal sinus rhythm, No ST-T changes, EP Interp, The Axis is rightward.  ,  Ectopy Frequent, premature atrial contractions, QRS interval IRBBB, Premature atrial complex .    Results review:  Lab results : Lab View   10/30/2021 18:09 CDT     Est Creat Clearance Ser   72.21 mL/min    10/30/2021 16:34 CDT     Sample ABG                arterial                             Treatment                 ncat 2 lpm                             Site                      Radial Rt                             pH Art                    7.39                             pCO2 Art                  66.0 mmHg  CRIT                             pO2 Art                   70.0 mmHg  LOW                             HCO3 Art                  40.0 mmol/L  HI                             CO2 Totl Art              42.0 mmol/L  HI                             O2 Sat Art                95.0 %                             D base                    11.3  HI                             THB ABG                   17.0 gm/dL  HI                             CO Hgb                    7.2 %  NA                             Met Hgb Art               0.8 %                             O2 Hgb                    87.4 %  LOW                             Allens                    Positive    10/30/2021 16:11 CDT     Sodium Lvl                140 mmol/L                             Potassium Lvl             3.4 mmol/L  LOW                             Chloride                  97 mmol/L  LOW                             CO2                       29 mmol/L                             Calcium Lvl               10.1 mg/dL                             Glucose Lvl               193 mg/dL  HI                             BUN                       7.8 mg/dL  LOW                             Creatinine                1.04 mg/dL                             eGFR-AA                   94                             eGFR-SAMI                  78 mL/min/1.73 m2  LOW                             Bili Total                0.4 mg/dL                             Bili  Direct               0.2 mg/dL                             Bili Indirect             0.20 mg/dL                             AST                       17 unit/L                             ALT                       17 unit/L                             Alk Phos                  133 unit/L                             Total Protein             7.2 gm/dL                             Albumin Lvl               3.6 gm/dL                             Globulin                  3.6 gm/dL  HI                             A/G Ratio                 1.0 ratio  LOW                             BNP                       13.6 pg/mL                             Troponin-I                <0.010 ng/mL                             WBC                       8.4 x10(3)/mcL                             RBC                       7.51 x10(6)/mcL  HI                             Hgb                       16.9 gm/dL                             Hct                       56.3 %  HI                             Platelet                  251 x10(3)/mcL                             MCV                       75.0 fL  LOW                             MCH                       22.5 pg  LOW                             MCHC                      30.0 gm/dL  LOW                             RDW                       21.4 %  HI                             MPV                       9.1 fL                             Abs Neut                  5.86 x10(3)/mcL                             Neutro Auto               70 %  NA                             Lymph Auto                20 %  NA                             Mono Auto                 8 %  NA                             Eos Auto                  2 %  NA                             Abs Eos                   0.2 x10(3)/mcL                             Basophil Auto             1 %  NA                             Abs Neutro                5.86 x10(3)/mcL                             Abs Lymph                 1.6 x10(3)/mcL                              Abs Mono                  0.6 x10(3)/mcL                             Abs Baso                  0.0 x10(3)/mcL                             NRBC%                     0.0 %                             IG%                       0 %  NA                             IG#                       0.030  NA    ,    No qualifying data available  .    Radiology results:  X-ray, reviewed radiologist's report, Rad Results (ST)  < 12 hrs   Accession: [id number]  Order: XR Chest 2 Views  Report Dt/Tm: 10/30/2021 17:47  Report:   EXAMINATION  XR Chest 2 Views     INDICATION  Dyspnea, nonproductive cough     Comparison: 21 June, 2020     FINDINGS  Lines/tubes/devices:  ECG leads overlie the chest.     The cardiomediastinal silhouette and central pulmonary vasculature are  unremarkable. Scattered aortic calcification is incidentally noted.  The trachea is midline. There is no lobar consolidation, pleural  effusion, or pneumothorax.     There is no acute osseous or extrathoracic abnormality.     IMPRESSION  No acute thoracic abnormality.      .       Procedure   Critical care note   Total time: 30 minutes spent engaged in work directly related to patient care and/ or available for direct patient care.   Critical condition(s) addressed for impending deterioration include: respiratory.   Associated risk factors: hypoxia.   Management: bedside assessment, supervision of care, Interpretation (chest x-ray, blood gases), Interventions hemodynamic management, Case review (medical specialist, nursing).   Teaching attestation: seen and examined with resident, Plan of care developed together, Documentation developed together.   Performed by: self, resident.      Impression and Plan   Diagnosis   COPD exacerbation (JKZ91-TG J44.1)   Hypoxemia (WXL48-BH R09.02)   COPD exacerbation (VUR83-YG J44.1)      Calls-Consults   -  10/30/2021 18:17:00 , Internal Medicine.    Plan   Condition: Unchanged.    Disposition: Admit time   10/30/2021 18:17:00, Admit to Inpatient Unit.    Counseled: Patient, Regarding diagnosis, Regarding diagnostic results, Regarding treatment plan, Patient indicated understanding of instructions,  regarding prescription    Notes: Patient recieved rocephin and zithromax with 125 Solumedrol at the ED. Jerod needs oxygen since his home oxygen is . Patient is on 4L NC sating at 94%. Patient is bering admitted to the medicine team at this time. .       Addendum      Teaching-Supervisory Addendum-Brief   I participated in the following activities of this patients care: the medical history, the physical exam, medical decision making, the procedure.   I personally performed: supervision of the patient's care, the medical history, the physical exam, the medical decision making.   The case was discussed with: the resident.   Procedures: I performed the procedure.   Evaluation and management service: I agree with the evaluation and management decisions made in this patient's care.   Results interpretation: I agree with the documentation of the study interpretation.   Notes: I, Dr. KIRSTIN Santana FACEP, supervised Dr. SADIE Samuel Internal Medicine resident during his evaluation of Pt Benjie Patterson . This patient presents to the Emergency Department with history of CAD, HTN, COPD presents with SOB requesting refill in home O2 and the physical exam was relevant for mild distress, hypoxia, B/L diminiched breath sounds. Dr. SADIE samuel, discuss this case with me and I agree with his documentation, procedures and plan. I personally performed the  EKG interpretation, Imaging interpretation / review, critical care procedure, MDM and Admit process with the resident for this patient. Teaching Time: 15 min.

## 2022-04-30 NOTE — ED PROVIDER NOTES
"   Patient:   Benjie Patterson             MRN: [id number]            FIN: [id number]               Age:   58 years     Sex:  Male     :  1960   Associated Diagnoses:   Bipolar I disorder with mirza; CHRISTIANO (acute kidney injury)   Author:   William Flowers MD      Basic Information   Time seen: Date & time 10/1/2019 19:55:00.   History source: Patient.   Arrival mode: Private vehicle.   History limitation: None.   Additional information: Chief Complaint from Nursing Triage Note : Chief Complaint   10/1/2019 19:39 CDT      Chief Complaint           pt transported to the ed (with family) secondary to "excessive talking" and "not acting himself" (x)2 days, history of bipolar/schizophrenia, has not been taking meds, denies SI/HI/AH/VH, vss, cleared by [facility] security...  .   Provider/Visit info:   Time Seen:  William Flowers MD / 10/01/2019 19:53  .   History of Present Illness   The patient presents with psychiatric problem.  The onset was 2  days ago.  The course/duration of symptoms is constant.  Character of symptoms Tangential speech.  The degree of symptoms is moderate.  Self injury: none.  The exacerbating factor is none.  Risk factors consist of non-compliance.  Prior episodes: rare.  Therapy today: none.  Associated symptoms: denies fever and denies chills.  Additional history: eligible for legal hold.      58-year-old gentleman brought to the emergency room by family for "excessive talking".  Patient does have a history of bipolar disorder, and reported no longer compliant with his medications.  Patient is a poor historian due to rambling speech and tangential thoughts.  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  Patient is currently calm, " cooperative, redirectable.  Denies suicidal or homicidal ideations.  Family is currently not available for questioning as they left immediately after the patient was checked into the emergency room..        Review of Systems   Constitutional symptoms:  No fever, no chills.    Respiratory symptoms:  No shortness of breath, no orthopnea.    Cardiovascular symptoms:  No chest pain, no palpitations.    Gastrointestinal symptoms:  No abdominal pain, no nausea, no vomiting.    Neurologic symptoms:  No headache, no dizziness.              Additional review of systems information: All other systems reviewed and otherwise negative.      Health Status   Allergies:    Allergic Reactions (Selected)  No Known Medication Allergies,    Allergies (1) Active Reaction  No Known Medication Allergies None Documented  .   Medications:  (Selected)   Prescriptions  Prescribed  Norvasc 5 mg oral tablet: 5 mg = 1 tab(s), Oral, Daily, # 90 tab(s), 3 Refill(s), Pharmacy: [facility]  Pantoprazole 40 mg ORAL EC-Tablet: 40 mg = 1 tab(s), Oral, Daily, # 30 tab(s), 5 Refill(s), Pharmacy: [facility] [id number]  Symbicort 80 mcg-4.5 mcg/inh inhalation aerosol: 2 puff(s), INH, BID, # 3 EA, 3 Refill(s), Pharmacy: [facility] [id number]  Ventolin HFA 90 mcg/inh inhalation aerosol: 1 puff(s), INH, q4hr, PRN PRN for wheezing, # 3 EA, 3 Refill(s), Pharmacy: [facility]  albuterol CFC free 90 mcg/inh inhalation aerosol with adapter: 1 puff(s), INH, QID, PRN PRN for wheezing, # 1 EA, 4 Refill(s), Pharmacy: [facility]  aspirin 81 mg oral Delayed Release (EC) tablet: 81 mg = 1 tab(s), Oral, Daily, # 90 tab(s), 1 Refill(s), Pharmacy: [facility] [id number]  atorvastatin 40 mg oral tablet: 40 mg = 1 tab(s), Oral, Daily, # 30 tab(s), 6 Refill(s), Pharmacy: [facility] [id number]  blood pressure cuff: blood pressure cuff, See Instructions, take BP reading first thing in morning, # 1 EA, 0 Refill(s),  Pharmacy: [facility] [place] [address]  fenofibrate 200 mg oral capsule: 200 mg = 1 cap(s), Oral, Daily, # 30 cap(s), 5 Refill(s), [facility] [place] [address]  glucometer: glucometer, See Instructions, Check CBG 4 times per day.  Medically necessary, # 1 EA, 0 Refill(s), [facility] [place] [address]  lancets, strips, alcohol swabs: lancets, strips, alcohol swabs, See Instructions, medicially necessary  check CBGs 4x per day, # 100 EA, 11 Refill(s), [facility]  lisinopril 40 mg oral tablet: 40 mg = 1 tab(s), Oral, Daily, # 90 tab(s), 3 Refill(s), [facility]  metformin 500 mg oral tablet: 500 mg = 1 tab(s), Oral, BID, Okay to restart, # 60 tab(s), 6 Refill(s), [facility]: [facility]  metoprolol succinate 25 mg oral tablet extended release: 25 mg = 1 tab(s), Oral, Daily, # 30 tab(s), 5 Refill(s), Pharmacy: [facility]  omega-3 polyunsaturated fatty acids 350 mg oral capsule: 350 mg = 1 cap(s), Oral, Daily, # 30 cap(s), 3 Refill(s), Pharmacy: [facility]  sildenafil 25 mg oral tablet: 25 mg = 1 tab(s), Oral, Daily, 1 hour before sexual activity, # 10 tab(s), 3 Refill(s), [facility]  urea 40% topical cream: 1 inés, TOP, BID, PRN PRN for dry skin, to affected area, # 45 gm, 0 Refill(s), [facility]: [facility]  Documented Medications  Documented  aripiprazole 10 mg oral tablet: See Instructions, take one tablet in morning and 2 tablet at bedtime  buPROPion 150 mg/24 hours (XL) oral tablet, extended release: 150 mg = 1 tab(s), Oral, Daily, at 3pm daily  escitalopram 10 mg oral tablet: 10 mg = 1 tab(s), Oral, Daily  hydrOXYzine pamoate 50 mg oral capsule: 100 mg = 2 cap(s), See Instructions, Oral, At Bedtime  oxymetazoline 0.05% nasal spray: 2 spray(s), Nasal, BID, # 15 mL, 0 Refill(s)  risperidone 2 mg oral tablet: 4 mg = 2 tab(s), Oral, qPM.      Past Medical/ Family/ Social  History   Medical history:    No active or resolved past medical history items have been selected or recorded., Reviewed as documented in chart.   Surgical history:    pt denies., Reviewed as documented in chart.   Family history:    Primary malignant neoplasm of lung  Brother  , Reviewed as documented in chart.   Social history:    Social & Psychosocial Habits    Alcohol  04/01/2015 Risk Assessment: Denies Alcohol Use    09/28/2016  Use: Past    Type: Beer, Liquor, Wine    Comment: Quit 5-6 yrs ago - 09/28/2016 13:40 - Tete Wells LPN    Employment/School  04/01/2015  Description: disabled    12/20/2016  Status: DISABLE    Comment: 6TH GRADE LEVEL - 12/20/2016 08:33 - Enriqueta Gillis LPN    Exercise  07/01/2015  Times per week: Daily    Self assessment: Fair condition    Exercise type: Walking    Home/Environment  04/01/2015  Lives with: Alone    Living situation: Home/Independent    Alcohol abuse in household: No    Substance abuse in household: No    Smoker in household: Yes    Injuries/Abuse/Neglect in household: No    Feels unsafe at home: No    Family/Friends available to help: Yes    Concern for family members at home: No    Major illness in household: No    Financial concerns: No    Concerns over TV/Computer/Game use: No    Nutrition/Health  05/22/2019  Type of diet: Diabetic    Caffeine intake amount: coffee 2 cup in morning    Wants to lose weight: Yes    Sleeping concerns: No    Feels highly stressed: No    Substance Use  04/01/2015 Risk Assessment: Denies Substance Abuse    09/28/2016  Use: Never    Tobacco  08/05/2019  Use: 10 or more cigarettes (1/    Patient Wants Consult For Cessation Counseling No    10/01/2019  Use: Former smoker, quit more    Patient Wants Consult For Cessation Counseling No    Abuse/Neglect  08/05/2019  SHX Any signs of abuse or neglect No    10/01/2019  SHX Any signs of abuse or neglect No  , Reviewed as documented in chart.   Problem list:    Active Problems  (5)  Obesity   Tobacco user   Tobacco user   Tobacco user   Tobacco user   .      Physical Examination               Vital Signs   Vital Signs   10/1/2019 19:39 CDT      Temperature Oral          36.7 DegC                             Temperature Oral (calculated)             98.06 DegF                             Peripheral Pulse Rate     128 bpm  HI                             Respiratory Rate          18 br/min                             SpO2                      95 %                             Oxygen Therapy            Room air                             Systolic Blood Pressure   105 mmHg                             Diastolic Blood Pressure  63 mmHg  .      Vital Signs (last 24 hrs)_____  Last Charted___________  Temp Oral     36.7 DegC  (OCT 01 19:39)  Heart Rate Peripheral   H 128bpm  (OCT 01 19:39)  Resp Rate         18 br/min  (OCT 01 19:39)  SBP      105 mmHg  (OCT 01 19:39)  DBP      63 mmHg  (OCT 01 19:39)  SpO2      95 %  (OCT 01 19:39)  Weight      87.4 kg  (OCT 01 19:39)  Height      167 cm  (OCT 01 19:39)  BMI      31.34  (OCT 01 19:39)  .   Measurements   10/1/2019 19:39 CDT      Weight Dosing             87.4 kg                             Weight Measured           87.4 kg                             Weight Measured and Calculated in Lbs     192.68 lb                             Height/Length Dosing      167 cm                             Height/Length Measured    167 cm                             Body Mass Index Measured  31.34 kg/m2  .   Basic Oxygen Information   10/1/2019 19:39 CDT      SpO2                      95 %                             Oxygen Therapy            Room air  .   General:  Alert, no acute distress, Not ill-appearing,    Skin:  Intact, moist.    Head:  Normocephalic, atraumatic.    Eye:  Pupils are equal, round and reactive to light, extraocular movements are intact, normal conjunctiva.    Ears, nose, mouth and throat:  Oral mucosa moist.   Cardiovascular:  No murmur,  Normal peripheral perfusion, No edema, Tachycardia, Regular rhythm.    Respiratory:  Lungs are clear to auscultation, respirations are non-labored, breath sounds are equal, Symmetrical chest wall expansion.    Gastrointestinal:  Soft, Nontender, Non distended, Normal bowel sounds, No organomegaly.    Neurological:  Alert and oriented to person, place, time, and situation, No focal neurological deficit observed.    Psychiatric:  Cooperative, Abnormal / Psychotic thoughts: Tangential, flight of ideas.       Medical Decision Making   Rationale:  Due to tangential speech and flight of ideas, patient appears to be gravely disabled, therefore will be placed under physicians emergency certificate for inpatient psychiatric evaluation and treatment..   Documents reviewed:  Emergency department nurses' notes.   Electrocardiogram:  Time 10/1/2019 20:26:00, rate 123, No ST-T changes, no ectopy, normal AL & QRS intervals, EP Interp, The Rhythm is sinus tachycardia.  .    Results review:  Lab results : Lab View   10/1/2019 20:20 CDT      Sodium Lvl                140 mmol/L                             Potassium Lvl             4.8 mmol/L                             Chloride                  107 mmol/L                             CO2                       25 mmol/L                             Calcium Lvl               9.5 mg/dL                             Glucose Lvl               142 mg/dL  HI                             BUN                       56 mg/dL  HI                             Creatinine                3.00 mg/dL  HI                             eGFR-AA                   28 mL/min  LOW                             eGFR-SAMI                  23 mL/min  LOW                             Bili Total                0.6 mg/dL                             Bili Direct               0.2 mg/dL                             Bili Indirect             0.4 mg/dL                             AST                       48 unit/L  HI                              ALT                       34 unit/L                             Alk Phos                  108 unit/L                             Total Protein             7.5 gm/dL                             Albumin Lvl               3.8 gm/dL                             Globulin                  3.70 gm/mL  HI                             A/G Ratio                 1.0 ratio  LOW                             TSH                       1.200 mIU/L                             WBC                       13.6 x10(3)/mcL  HI                             RBC                       5.24 x10(6)/mcL                             Hgb                       15.6 gm/dL                             Hct                       45.9 %                             Platelet                  272 x10(3)/mcL                             MCV                       87.6 fL                             MCH                       29.8 pg                             MCHC                      34.0 gm/dL                             RDW                       14.6 %  HI                             MPV                       9.4 fL                             Segs Man                  66 %                             Lymph Man                 20.0 %  LOW                             Monocyte Man              12 %  HI                             Eos Man                   0 %                             Basophil Man              0 %                             Abn Lymph Man             2 %  HI                             Platelet Est              Adequate                             RBC Morph                 Normal                             Ethanol Lvl               <3.0 mg/dL    10/1/2019 20:00 CDT      U pH                      5.5                             UA Appear                 Hazy                             UA Color                  YELLOW                             UA Spec Grav              1.021                             UA Bili                    0.5 mg/dL                             UA pH                     5.5                             UA Urobilinogen           2 mg/dL                             UA Blood                  Negative                             UA Glucose                30 mg/dL                             UA Ketones                Trace                             UA Protein                70 mg/dL                             UA Nitrite                Negative                             UA Leuk Est               250 Sophia/uL                             UA WBC Interp             26-50 /HPF                             UA RBC Interp             0-2                             UA Mucous                 Trace                             UA Bact Interp            None Seen                             UA Squam Epi Interp                                    UA Fine Gran              Occasional                             UA Hyal Cast Interp       26-50                             U Temp                    25.0 DegC                             U Amph Scr                Negative                             U Yarelis Scr                Negative                             U Benzodia Scr            Negative                             U Cannab Scr              Positive                             U Cocaine Scr             Negative                             U Opiate Scr              Negative                             U Phencyclidine Scr       Negative                             HIV Rapid Scrn            Non-Reactive  , Interpretation Abnormal results  elevated creatinine.       Reexamination/ Reevaluation   Time: 10/1/2019 21:46:00 .   Notes: Due to elevated creatinine, which appears to be prerenal, internal medicine has been consults it due to acute kidney injury.  Patient currently receiving IV fluids.  Patient has been placed under physicians emergency certificate.  Recommend transfer to inpatient psychiatric unit for additional treatment  after medically stabilized..      Procedure   Critical care note   Total time: 45 minutes spent engaged in work directly related to patient care and/ or available for direct patient care, exclusive of procedure time.   Critical condition(s) addressed for impending deterioration include: cardiovascular, renal.   Associated risk factors: dehydration.   Performed by: self.      Impression and Plan   Diagnosis   Bipolar I disorder with mirza ([id number] F31.10)   Bipolar I disorder with mirza ([id number] F31.10)   CHRISTIANO (acute kidney injury) ([id number] N17.9)      Calls-Consults   -  10/1/2019 21:48:00 , Internal Medicine, consult.    Plan   Condition: Stable.    Disposition: Admit time  10/1/2019 21:48:00, Place in Observation Unit, Dispositioned by: Time: 10/1/2019 20:17:00, Lionel RIVERA, William FULTON, Transfer to inpatient psychatric facility.    Counseled: Patient, Regarding diagnosis, Regarding diagnostic results, Regarding treatment plan, Patient indicated understanding of instructions.

## 2022-05-05 ENCOUNTER — OFFICE VISIT (OUTPATIENT)
Dept: CARDIOLOGY | Facility: CLINIC | Age: 62
End: 2022-05-05
Payer: MEDICAID

## 2022-05-05 VITALS
SYSTOLIC BLOOD PRESSURE: 100 MMHG | HEART RATE: 113 BPM | RESPIRATION RATE: 24 BRPM | DIASTOLIC BLOOD PRESSURE: 68 MMHG | BODY MASS INDEX: 27.34 KG/M2 | HEIGHT: 67 IN | OXYGEN SATURATION: 95 % | TEMPERATURE: 98 F | WEIGHT: 174.19 LBS

## 2022-05-05 DIAGNOSIS — J44.9 CHRONIC OBSTRUCTIVE PULMONARY DISEASE, UNSPECIFIED COPD TYPE: ICD-10-CM

## 2022-05-05 DIAGNOSIS — I25.10 CORONARY ARTERY DISEASE INVOLVING NATIVE CORONARY ARTERY OF NATIVE HEART WITHOUT ANGINA PECTORIS: Primary | ICD-10-CM

## 2022-05-05 DIAGNOSIS — E78.5 HYPERLIPIDEMIA LDL GOAL <70: ICD-10-CM

## 2022-05-05 DIAGNOSIS — I10 HTN (HYPERTENSION), BENIGN: ICD-10-CM

## 2022-05-05 DIAGNOSIS — Z72.0 TOBACCO USE: ICD-10-CM

## 2022-05-05 PROCEDURE — 1159F PR MEDICATION LIST DOCUMENTED IN MEDICAL RECORD: ICD-10-PCS | Mod: CPTII,,, | Performed by: NURSE PRACTITIONER

## 2022-05-05 PROCEDURE — 99214 OFFICE O/P EST MOD 30 MIN: CPT | Mod: S$PBB,,, | Performed by: NURSE PRACTITIONER

## 2022-05-05 PROCEDURE — 3078F DIAST BP <80 MM HG: CPT | Mod: CPTII,,, | Performed by: NURSE PRACTITIONER

## 2022-05-05 PROCEDURE — 3078F PR MOST RECENT DIASTOLIC BLOOD PRESSURE < 80 MM HG: ICD-10-PCS | Mod: CPTII,,, | Performed by: NURSE PRACTITIONER

## 2022-05-05 PROCEDURE — 1159F MED LIST DOCD IN RCRD: CPT | Mod: CPTII,,, | Performed by: NURSE PRACTITIONER

## 2022-05-05 PROCEDURE — 99214 PR OFFICE/OUTPT VISIT, EST, LEVL IV, 30-39 MIN: ICD-10-PCS | Mod: S$PBB,,, | Performed by: NURSE PRACTITIONER

## 2022-05-05 PROCEDURE — 4010F ACE/ARB THERAPY RXD/TAKEN: CPT | Mod: CPTII,,, | Performed by: NURSE PRACTITIONER

## 2022-05-05 PROCEDURE — 4010F PR ACE/ARB THEARPY RXD/TAKEN: ICD-10-PCS | Mod: CPTII,,, | Performed by: NURSE PRACTITIONER

## 2022-05-05 PROCEDURE — 3008F PR BODY MASS INDEX (BMI) DOCUMENTED: ICD-10-PCS | Mod: CPTII,,, | Performed by: NURSE PRACTITIONER

## 2022-05-05 PROCEDURE — 99214 OFFICE O/P EST MOD 30 MIN: CPT | Mod: PBBFAC | Performed by: NURSE PRACTITIONER

## 2022-05-05 PROCEDURE — 3074F PR MOST RECENT SYSTOLIC BLOOD PRESSURE < 130 MM HG: ICD-10-PCS | Mod: CPTII,,, | Performed by: NURSE PRACTITIONER

## 2022-05-05 PROCEDURE — 3008F BODY MASS INDEX DOCD: CPT | Mod: CPTII,,, | Performed by: NURSE PRACTITIONER

## 2022-05-05 PROCEDURE — 3074F SYST BP LT 130 MM HG: CPT | Mod: CPTII,,, | Performed by: NURSE PRACTITIONER

## 2022-05-05 RX ORDER — ESCITALOPRAM OXALATE 20 MG/1
20 TABLET ORAL DAILY
COMMUNITY
Start: 2022-04-19

## 2022-05-05 RX ORDER — METOPROLOL SUCCINATE 25 MG/1
25 TABLET, EXTENDED RELEASE ORAL DAILY
Qty: 30 TABLET | Refills: 11 | Status: SHIPPED | OUTPATIENT
Start: 2022-05-05 | End: 2023-04-04 | Stop reason: SDUPTHER

## 2022-05-05 RX ORDER — ASPIRIN 81 MG/1
162 TABLET ORAL DAILY
COMMUNITY
Start: 2021-12-16 | End: 2022-12-07 | Stop reason: SDUPTHER

## 2022-05-05 RX ORDER — AMLODIPINE BESYLATE 5 MG/1
5 TABLET ORAL DAILY
Qty: 30 TABLET | Refills: 11 | Status: SHIPPED | OUTPATIENT
Start: 2022-05-05 | End: 2023-04-04

## 2022-05-05 RX ORDER — PALIPERIDONE 3 MG/1
3 TABLET, EXTENDED RELEASE ORAL DAILY
COMMUNITY
Start: 2022-04-19

## 2022-05-05 RX ORDER — BUPROPION HYDROCHLORIDE 150 MG/1
150 TABLET ORAL DAILY
COMMUNITY
Start: 2022-01-04

## 2022-05-05 RX ORDER — METOPROLOL SUCCINATE 25 MG/1
25 TABLET, EXTENDED RELEASE ORAL DAILY
COMMUNITY
Start: 2022-04-19 | End: 2022-05-05 | Stop reason: SDUPTHER

## 2022-05-05 RX ORDER — CLOPIDOGREL BISULFATE 75 MG/1
75 TABLET ORAL DAILY
COMMUNITY
Start: 2022-04-19 | End: 2022-05-05 | Stop reason: SDUPTHER

## 2022-05-05 RX ORDER — ATORVASTATIN CALCIUM 80 MG/1
80 TABLET, FILM COATED ORAL DAILY
Qty: 90 TABLET | Refills: 3 | Status: SHIPPED | OUTPATIENT
Start: 2022-05-05 | End: 2023-04-04

## 2022-05-05 RX ORDER — ATORVASTATIN CALCIUM 80 MG/1
80 TABLET, FILM COATED ORAL DAILY
COMMUNITY
Start: 2021-12-15 | End: 2022-05-05 | Stop reason: SDUPTHER

## 2022-05-05 RX ORDER — LISINOPRIL 10 MG/1
10 TABLET ORAL DAILY
COMMUNITY
Start: 2022-04-19 | End: 2022-05-05 | Stop reason: SDUPTHER

## 2022-05-05 RX ORDER — TRAZODONE HYDROCHLORIDE 100 MG/1
200 TABLET ORAL DAILY
COMMUNITY
Start: 2022-04-19

## 2022-05-05 RX ORDER — METFORMIN HYDROCHLORIDE 500 MG/1
500 TABLET ORAL 2 TIMES DAILY WITH MEALS
COMMUNITY
Start: 2022-04-19 | End: 2022-10-05 | Stop reason: SDUPTHER

## 2022-05-05 RX ORDER — CLOPIDOGREL BISULFATE 75 MG/1
75 TABLET ORAL DAILY
Qty: 30 TABLET | Refills: 11 | Status: SHIPPED | OUTPATIENT
Start: 2022-05-05 | End: 2023-03-07

## 2022-05-05 RX ORDER — ERGOCALCIFEROL 1.25 MG/1
50000 CAPSULE ORAL DAILY
COMMUNITY
Start: 2021-10-26 | End: 2022-05-11

## 2022-05-05 RX ORDER — LISINOPRIL 10 MG/1
10 TABLET ORAL DAILY
Qty: 90 TABLET | Refills: 3 | Status: SHIPPED | OUTPATIENT
Start: 2022-05-05 | End: 2022-11-17

## 2022-05-05 RX ORDER — AMLODIPINE BESYLATE 5 MG/1
5 TABLET ORAL DAILY
COMMUNITY
Start: 2022-01-04 | End: 2022-05-05 | Stop reason: SDUPTHER

## 2022-05-05 RX ORDER — FLUTICASONE PROPIONATE AND SALMETEROL 100; 50 UG/1; UG/1
1 POWDER RESPIRATORY (INHALATION) DAILY
COMMUNITY
End: 2022-11-17 | Stop reason: SDUPTHER

## 2022-05-05 RX ORDER — ALBUTEROL SULFATE 0.83 MG/ML
2.5 SOLUTION RESPIRATORY (INHALATION) CONTINUOUS PRN
COMMUNITY
Start: 2021-11-01

## 2022-05-05 NOTE — HISTORICAL [facility]
This is a historical note converted from [employer]. Formatting and pictures may have been removed.  Please reference [facility] for original formatting and attached multimedia. Chief Complaint  pt reports to the ed c/o pain/redness to surgical wound on left-upper abdomen (x)1 week, afebrile at this time, no active ?bleeding or drainage noted, vss..  Reason for Consultation  swelling and erythema of post op incision  History of Present Illness  58 y/o M POD 10 s/p L adreanalectomy for functioning adenoma presents to ED for incisional swelling and mild erythema. Patient reports that several days ago, he had a coughing fit and felt something pop at his incision site. Patient then reports increased swelling and pain. Patient denies fevers, chills nausea, local heat, only reports pain on deep palpation.  Review of Systems  Constitutional:?no fever, fatigue, weakness  Eye:?no vision loss, eye redness, drainage, or pain  ENMT:?no sore throat, ear pain, sinus pain/congestion, nasal congestion/drainage  Respiratory:?no cough, no wheezing,?mild ?shortness of breath  Cardiovascular:?no chest pain, no palpitations, no edema  Gastrointestinal:?no nausea, vomiting, or diarrhea.  Genitourinary:?no dysuria, no urinary frequency or urgency, no hematuria  Hema/Lymph:?no abnormal bruising or bleeding  Endocrine:?no heat or cold intolerance, no excessive thirst or excessive urination  Musculoskeletal:?no muscle or joint pain, no joint swelling  Integumentary:?no skin rash or abnormal lesion  Neurologic: no headache, no dizziness, no weakness or numbness  ?  Physical Exam  Vitals & Measurements  T:?37.1? ?C (Oral)? HR:?91(Peripheral)? HR:?95(Monitored)? RR:?20? BP:?148/101? SpO2:?95%? WT:?92?kg? BMI:?32.99?  General:?well-developed well-nourished in no acute distress  Eye: PERRLA, EOMI, clear conjunctiva, eyelids normal  Neck: full range of motion, no thyromegaly or lymphadenopathy  Respiratory:?clear to auscultation bilaterally, on 2L NC,  satting 97% 91 % on RA  Cardiovascular:?regular rate and rhythm without murmurs, gallops or rubs  Gastrointestinal:?soft, non-tender, non-distended with normal bowel sounds, Large outswelling surrounding L abdominal insicion with ?mild erythema, no induration, no fluctuation, minimal tenderness on palpation.  Genitourinary: no CVA tenderness to palpation  Musculoskeletal:?full range of motion of all extremities/spine without limitation or discomfort  Integumentary: no rashes or skin lesions present  Neurologic: cranial nerves intact, no signs of peripheral neurological deficit, motor/sensory function intact  ?  ?   LABS:  Labs Last 24 Hours?  ?Hematology/Coagulation?   PT: 12.4 second(s) (06/21/20 14:15:00)   INR: 0.96 (06/21/20 14:15:00)   PTT: 26.6 second(s) (06/21/20 14:15:00)   WBC: 10.6 x10(3)/mcL (06/21/20 14:15:00)   RBC: 5.29 x10(6)/mcL (06/21/20 14:15:00)   Hgb: 15.1 gm/dL (06/21/20 14:15:00)   Hct: 47.7 % (06/21/20 14:15:00)   Platelet: 324 x10(3)/mcL (06/21/20 14:15:00)   MCV: 90.2 fL (06/21/20 14:15:00)   MCH: 28.5 pg (06/21/20 14:15:00)   MCHC: 31.7 gm/dL (06/21/20 14:15:00)   RDW:?15.4 %?High (06/21/20 14:15:00)   MPV: 9 fL (06/21/20 14:15:00)   Abs Neut: 7.32 x10(3)/mcL (06/21/20 14:15:00)   Neutro Auto: 69 % (06/21/20 14:15:00)   Lymph Auto: 17 % (06/21/20 14:15:00)   Mono Auto: 9 % (06/21/20 14:15:00)   Eos Auto: 2 % (06/21/20 14:15:00)   Abs Eos: 0.2 x10(3)/mcL (06/21/20 14:15:00)   Basophil Auto: 1 % (06/21/20 14:15:00)   Abs Neutro: 7.32 x10(3)/mcL (06/21/20 14:15:00)   Abs Lymph: 1.8 x10(3)/mcL (06/21/20 14:15:00)   Abs Mono: 1 x10(3)/mcL (06/21/20 14:15:00)   Abs Baso: 0.1 x10(3)/mcL (06/21/20 14:15:00)   IG%: 2 % (06/21/20 14:15:00)   IG#: 0.19 (06/21/20 14:15:00)   ?  ?  BEDSIDE US  large amount of Subcutaneous tissue with hernia like quality towards incision.  Anteriorly, no fluid pocket discernable  Posteriorly small pocket noted. ?  Assessment/Plan  Abdominal pain  Wound reevaluation with  or without suture removal?[id number]  60 y/o M POD 10 s/p L adreanalectomy for functioning adenoma presents to ED for incisional swelling and mild erythema  ?   Swelling  - most likely seroma/hematoma  - Cotton tipped Applicator used to enter pocket  - 40 cc of clear serosanguinous fluid  -no foul odor, no purulent material, patient being afebrile and with no leukocytosis decrease likelihood of abscess.  -?5 days of cephlex for wound to heal  - local wound care  - Follow up in surgery clinic on Tuesday  - Wear provided Abdominal binder.  ?   ?  Possible component of hernia involved  Would benefit from further outpatient workup.  ?   Arnel Soriano  [facility] [facility]   Problem List/Past Medical History  Ongoing  Adrenal adenoma  Bipolar  Diabetes mellitus  Hypertension  Obesity  Schizophrenia  Tobacco user  Tobacco user  Tobacco user  Tobacco user  Historical  No qualifying data  Procedure/Surgical History  Adrenalectomy (Left) (06/10/2020)  Resection of Left Adrenal Gland, Open Approach (06/10/2020)  pt denies   Medications  Inpatient  Lactated Ringers 1000ml 1,000 mL, 1000 mL, IV  Zosyn, 3.375 gm= 50 mL, IV Piggyback, q8hr  Home  amLODIPine 5 mg oral tablet, 5 mg= 1 tab(s), Oral, Daily  atorvastatin 40 mg oral tablet, 40 mg= 1 tab(s), Oral, Daily, 3 refills,? ?Not taking  blood pressure cuff, See Instructions,? ?Unable to obtain  buPROPion 150 mg/24 hours (XL) oral tablet, extended release, 150 mg= 1 tab(s), Oral, Daily,? ?Not taking: dose presently 150mg po qhs Last Dose Date/Time Unknown  Colace 100 mg oral capsule, 100 mg= 1 cap(s), Oral, BID, PRN  glucometer, See Instructions  hydrocortisone 10 mg oral tablet, 10 mg= 1 tab(s), Oral, qAM, 2 refills  hydrocortisone 5 mg oral tablet, See Instructions, 1 refills  hydrocortisone 5 mg oral tablet, 5 mg= 1 tab(s), Oral, qPM, 2 refills  lancets, strips, alcohol swabs, See Instructions, 11 refills  Lexapro 20 mg oral tablet, 20 mg= 1 tab(s), Oral,  qAM  lisinopril 40 mg oral tablet, See Instructions, 3 refills  metformin 500 mg oral tablet, See Instructions, 3 refills  metoprolol succinate 25 mg oral capsule, extended release, 25 mg= 1 cap(s), Oral, Daily, 3 refills  omeprazole 40 mg oral DR capsule, 40 mg= 1 cap(s), Oral, Daily, 3 refills  paliperidone 3 mg oral tablet, extended release, 3 mg= 1 tab(s), Oral, qPM  paliperidone 9 mg oral tablet, extended release, 9 mg= 1 tab(s), Oral, qAM  traZODone 100 mg oral tablet, 100 mg= 1 tab(s), Oral, BID  Allergies  No Known Medication Allergies  Social History  Abuse/Neglect  No, 06/21/2020  No, No, Yes, 06/10/2020  No, No, Yes, 06/08/2020  No, 06/04/2020  No, No, Yes, 03/11/2020  Alcohol - Denies Alcohol Use, 04/01/2015  Past, 06/04/2020  Past, Beer, Wine, Liquor, 01/10/2020  Employment/School  Disabled, 01/10/2020  Exercise  Exercise frequency: 3-4 times/week. Exercise type: Walking, Bicycle., 03/11/2020  Home/Environment  Lives with Alone., 03/11/2020  Nutrition/Health  Diabetic, Low fat, 03/11/2020  Substance Use - Denies Substance Abuse, 04/01/2015  Past, Marijuana, 03/11/2020  Tobacco  10 or more cigarettes (1/2 pack or more)/day in last 30 days, No, 06/21/2020  10 or more cigarettes (1/2 pack or more)/day in last 30 days, No, 06/10/2020  10 or more cigarettes (1/2 pack or more)/day in last 30 days, No, 06/08/2020  10 or more cigarettes (1/2 pack or more)/day in last 30 days, No, 06/04/2020  10 or more cigarettes (1/2 pack or more)/day in last 30 days, Cigars, No, 06/02/2020  Family History  Bipolar: Sister.  Heart disease: Father.  Primary malignant neoplasm of bone: Grandmother.  Primary malignant neoplasm of lung: Brother.  Immunizations  Vaccine Date Status Comments   influenza virus vaccine, inactivated 10/02/2019 Given    tetanus/diphtheria/pertussis, acel(Tdap) 09/28/2016 Given    pneumococcal 23-polyvalent vaccine 09/28/2016 Given    influenza virus vaccine, inactivated 09/28/2016 Given MFG SEQIRUS        I agree with resident documentation. I supervised resident, ?and discussed plan of care.

## 2022-05-05 NOTE — HISTORICAL [facility]
This is a historical note converted from Edith. Formatting and pictures may have been removed.  Please reference [facility] for original formatting and attached multimedia. Admit and Discharge Dates  Admit Date: 10/01/2019  Discharge Date: 10/02/2019  Physicians  Attending Physician - Milind RIVERA, Sadia  Admitting Physician - Milind RIVERA, Sadia  Primary Care Physician - Hipolito Shrestha DO  Discharge Diagnosis  CHRISTIANO (acute kidney injury)?N17.9  Bipolar I disorder with mirza?F31.10  Psychiatric screening exam?[id number]  Surgical Procedures  No procedures recorded for this visit.  Immunizations  10/02/2019 - influenza virus vaccine, inactivated?  Admission Information  58-year-old male with a past medical history of left adrenal adenoma lost to follow up, hypertension, hyperlipidemia, diabetes, tobacco abuse, depression reports to the ED with family secondary to excessive talking and not acting himself for 2 days. ?Patient has a questionable history of bipolar and or schizophrenia and is unsure if he has been taking his medications as prescribed. ?He is a very poor historian due to rambling speech and tangential thoughts. ?Patient states that he has no idea which medicines he has taken and not taken in the last week. ?No family present to aid in information gathering. ?Patient was placed on the physicians emergency trip to pick it in the ED and routine lab work showed the patient had an acute kidney injury with creatinine of 3.0 with baseline of 1.1. ?No electrolyte abnormalities. ?He also had a slightly elevated leukocytosis with a WBC count of 13.6. ?Chest x-ray within normal limits.? He received 3L of NS bolus in the ED.?He was admitted to the medical unit for treatment of CHRISTIANO, and due to his current mental status he was PECd for?fear that he was unable to take care of himself?at home in the context of his dehydration.  ?  Hospital Course  The next morning, repeat labs showed improvement as  evidenced by his Creatinine which was 1.4 from 3.0.? He continued to be tangential in his speech and was rambling, but was redirectable and able to answer questions appropriately.? He reports that he has not seen a psychiatrist for some time now, and has not been taking any medications for his bipolar disorder.? Family was not present during interview so due to patient mentation.? Upon discharge, he was afebrile, VS stable, and Creatinine was 1.0 on repeat before discharge to inpatient psychiatric facility.? Patient expressed agreement with plan.?  Significant Findings  Cr on admission: 3.0, Cr on discharge 1.0  Time Spent on discharge  >31 minutes  Objective  Vitals & Measurements  T:?36.0? ?C (Oral)? TMIN:?36.0? ?C (Oral)? TMAX:?37.0? ?C (Oral)? HR:?95(Monitored)? RR:?18? BP:?136/92? SpO2:?95%? WT:?87.4?kg? BMI:?31.34?  Physical Exam  Gen: He is alert and oriented x 3, NAD  Head: Normocephalic  Eyes: PERRL, EOMI, no conjunctival injection or pallor  Nose: No nasal discharge  Throat: No pharyngeal inflammation, erythema, discharge, mucous membranes moist  Neck: Supple. No carotid bruits.? No lymphadenopathy or thyromegaly.  Lungs: Clear to auscultation bilaterally  CV: Normal S1 & S2, RRR, no murmurs appreciated  Abdomen: Soft, NT/ND, bowel sounds present.? No hepatosplenomegaly  Ext: No cyanosis/clubbing/edema, pulses 2+  Neuro:?CN II-XII grossly intact, strength 5/5 throughout, normal sensation  Psych: Rambling and tangential speech, easily redirectable,?denies suicidal or homicidal ideations  ?  ?  Patient Discharge Condition  medically stable, mentally is not at baseline  Discharge Disposition  Discharge patient to inpatient psychiatric facility, [facility].? Will follow up in medicine clinic with PCP with Dr. Hipolito Shrestha DO.? Upon discharge from psychiatric facility, will obtain MRI abdomen?to follow up on adrenal mass seen on CT 9/25/2018.   Discharge Medication Reconciliation  Continue  Misc Prescription  (blood pressure cuff)?See Instructions  Misc Prescription (glucometer)?See Instructions  Misc Prescription (lancets, strips, alcohol swabs)?See Instructions  albuterol (Ventolin HFA 90 mcg/inh inhalation aerosol)?1 puff(s), INH, q4hr, PRN for wheezing  albuterol (albuterol CFC free 90 mcg/inh inhalation aerosol with adapter)?1 puff(s), INH, QID, PRN for wheezing  amLODIPine (Norvasc 5 mg oral tablet)?5 mg, Oral, Daily  aripiprazole (aripiprazole 10 mg oral tablet)?See Instructions  aspirin (aspirin 81 mg oral Delayed Release (EC) tablet)?81 mg, Oral, Daily  atorvastatin (atorvastatin 40 mg oral tablet)?40 mg, Oral, Daily  buPROPion (buPROPion 150 mg/24 hours (XL) oral tablet, extended release)?150 mg, Oral, Daily  budesonide-formoterol (Symbicort 80 mcg-4.5 mcg/inh inhalation aerosol)?2 puff(s), INH, BID  escitalopram (escitalopram 10 mg oral tablet)?10 mg, Oral, Daily  fenofibrate (fenofibrate 200 mg oral capsule)?200 mg, Oral, Daily  hydrOXYzine (hydrOXYzine pamoate 50 mg oral capsule)?100 mg, Oral, At Bedtime  lisinopril (lisinopril 40 mg oral tablet)?40 mg, Oral, Daily  metFORMIN (metformin 500 mg oral tablet)?500 mg, Oral, BID  metoprolol (metoprolol succinate 25 mg oral tablet extended release)?25 mg, Oral, Daily  omega-3 polyunsaturated fatty acids (omega-3 polyunsaturated fatty acids 350 mg oral capsule)?350 mg, Oral, Daily  oxymetazoline nasal (oxymetazoline 0.05% nasal spray)?2 spray(s), Nasal, BID  pantoprazole (Pantoprazole 40 mg ORAL EC-Tablet)?40 mg, Oral, Daily  risperiDONE (risperidone 2 mg oral tablet)?4 mg, Oral, qPM  sildenafil (sildenafil 25 mg oral tablet)?25 mg, Oral, Daily  urea topical (urea 40% topical cream)?1 inés, TOP, BID, PRN for dry skin  Education and Orders Provided  Acute Kidney Injury, Adult  Follow up  MRI Abdomen w/ and w/o contrast for adrenal adenoma  Report to Emergency Department if symptoms return or worsen  [facility] - Medicine Clinic, within 1 month      Patient admitted for CHRISTIANO  and psychosis. IV hydration. Patient was accepted into inpatient psych. He was been worked up for adenoma. Patient has an Endocrine appointment in November of 2019. MRI abdomen was in 2016. May need repeat imaging since it has been three years. Will discuss with Endocrine.

## 2022-05-05 NOTE — HISTORICAL [facility]
This is a historical note converted from Edith. Formatting and pictures may have been removed.  Please reference Edith for original formatting and attached multimedia. Subjective  Brief HPI: 61yo M with a Hx of COPD?Gold stage II?(last?PFTs?10/2021),?CAD,?HTN,?DM,?bipolar disorder, and schizophrenia?presented to the [facility] ED requesting a prescription for?home oxygen. ?Patient states that he is?going to have a?CABG?in November?at [facility]. ?He stated he was?evaluated?for?surgery clearance, and was told he needs?home oxygen. ?Patient states that he has not?had?home oxygen in a while because he left his?prescription?ran out. ?Patient denied?any?coughing,?any increased sputum production,?fevers,?nausea, vomiting, or chills.? Patient did report?some baseline?shortness of breath and?a recent?worsening of his shortness of breath.? Patient was admitted?for?hypoxic?respiratory distress.? Patient will need?prescription for home oxygen.  ?   11/2/21:? NAEON. No complaints. Awaiting home oxygen approval from insurance. SOB with exertion is stable. Requiring 3L nc. Eating and drinking without difficulty. Denies fevers, chills, chest pain and abdominal pain.  Review of Systems  Negative unless noted in HPI.  Objective  Vitals & Measurements  T:?36.7? ?C (Oral)? TMIN:?36.5? ?C (Oral)? TMAX:?36.7? ?C (Oral)? HR:?75(Peripheral)? RR:?22? BP:?166/96? SpO2:?92%?  Physical Exam  Gen: well appearing, resting in bed comfortably, NAD  CVS: RRR, no murmurs appreciated, no JVD  Resp: on 3L nc, nonlabored breathing, distant lung sounds diffusely, no wheezing or rhonchi appreciated  Abd: soft, nontender, nondistended, large reproducible circumferential abdominal hernia over left upper quadrant  Ext: no lower extremity edema, 2+ dorsal pedal pulses  Lab Results  Labs Last 24 Hours?  ?Chemistry? Hematology/Coagulation?   Sodium Lvl: 139 mmol/L (11/02/21 04:39:00) WBC:?16.3 x10(3)/mcL?High (11/02/21 04:39:00)   Potassium Lvl: 4.6  mmol/L (11/02/21 04:39:00) RBC:?6.97 x10(6)/mcL?High (11/02/21 04:39:00)   Chloride:?96 mmol/L?Low (11/02/21 04:39:00) Hgb: 16 gm/dL (11/02/21 04:39:00)   CO2:?35 mmol/L?High (11/02/21 04:39:00) Hct:?55.7 %?High (11/02/21 04:39:00)   Calcium Lvl: 10 mg/dL (11/02/21 04:39:00) Platelet: 232 x10(3)/mcL (11/02/21 04:39:00)   Glucose Lvl:?159 mg/dL?High (11/02/21 04:39:00) MCV:?79.9 fL?Low (11/02/21 04:39:00)   BUN: 13.9 mg/dL (11/02/21 04:39:00) MCH:?23 pg?Low (11/02/21 04:39:00)   Creatinine:?0.69 mg/dL?Low (11/02/21 04:39:00) MCHC:?28.7 gm/dL?Low (11/02/21 04:39:00)   Est Creat Clearance Ser: 108.83 mL/min (11/02/21 05:46:39) RDW:?21.2 %?High (11/02/21 04:39:00)   eGFR-AA: >105 (11/02/21 04:39:00) MPV: 8.8 fL (11/02/21 04:39:00)   eGFR-SAMI: >105 (11/02/21 04:39:00) Abs Neut:?14.69 x10(3)/mcL?High (11/02/21 04:39:00)   Bili Total: 0.3 mg/dL (11/02/21 04:39:00) Neutro Auto: 90 % (11/02/21 04:39:00)   Bili Direct: 0.2 mg/dL (11/02/21 04:39:00) Lymph Auto: 4 % (11/02/21 04:39:00)   Bili Indirect: 0.1 mg/dL (11/02/21 04:39:00) Mono Auto: 5 % (11/02/21 04:39:00)   AST: 11 unit/L (11/02/21 04:39:00) Basophil Auto: 0 % (11/02/21 04:39:00)   ALT: 14 unit/L (11/02/21 04:39:00) Abs Neutro:?14.69 x10(3)/mcL?High (11/02/21 04:39:00)   Alk Phos: 100 unit/L (11/02/21 04:39:00) Abs Lymph: 0.7 x10(3)/mcL (11/02/21 04:39:00)   Total Protein: 6.4 gm/dL (11/02/21 04:39:00) Abs Mono: 0.8 x10(3)/mcL (11/02/21 04:39:00)   Albumin Lvl:?3.1 gm/dL?Low (11/02/21 04:39:00) Abs Baso: 0 x10(3)/mcL (11/02/21 04:39:00)   Globulin: 3.3 gm/dL (11/02/21 04:39:00) NRBC%: 0.0 (11/02/21 04:39:00)   A/G Ratio:?0.9 ratio?Low (11/02/21 04:39:00) IG%: 1 % (11/02/21 04:39:00)    IG#: 0.11 (11/02/21 04:39:00)   Diagnostic Results  no new diagnostic results  Assessment/Plan  Hypoxemic respiratory distress  COPD Gold stage II  -Patient on?3 L?nasal cannula?satting?93%?this morning  -Antibiotics not indicated at this time  -Solu-Medrol 60 mg twice daily, to be  discharged on Medrol Dose Pack  -DuoNebs every?6 hours?as needed, will be discharged with Albuterol refill and new Rx of Advair  -Continue?supplemental oxygen, wean as necessary to keep?saturations?between 88% and 92%  -Ambulatory sats charted  -Case management working on home oxygen  ?   Leukocytosis  - Likely 2/2 steroid use, trending down 21.1 --> 19.0 -->?16.3?  - no concern for infection at this time  - will continue to monitor  ?   Bipolar disorder/schizophrenia  -[facility]  -Will continue bupropion, Lexapro,?trazodone  ?   CAD  -Patient recently evaluated?for?CABG, scheduled?for November 2021  -Continue aspirin and?atorvastatin?daily  ?   HTN  -Continue?lisinopril?10 mg,?metoprolol 25 mg  ?   DM  -Last?Hgb A1c?5.9 (9/2021)  -Will hold?home?oral?diabetic medication  -Low-dose?ISS initiated  ?  ?  PPx: Lovenox 40mg  Abx: none  IVF: none  Lines: PIV  Diet: Cardiac/Diabetic  ?  Dispo: Patient requires home oxygen. Pending insurance approval.  ?  Verenice Farmer M.D.  [facility] Resident, HO-1

## 2022-05-05 NOTE — HISTORICAL [facility] CERNER
This is a historical note converted from [place]. Formatting and pictures may have been removed.  Please reference [place] for original formatting and attached multimedia. 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  Immunizations  No immunizations recorded for this visit.  Hospital Course  Patient is a 59-year-old  male with history of adrenal adenoma, type 2 diabetes, hypertension, obesity presenting to ER to reevaluate abdominal wound following open left adrenalectomy on Kathie 10, 2020. ?Patient was seen by surgery and noted to have a seroma which was evacuated at bedside. ?Upon this evaluation by surgery, patient was noted to have desatted down to mid 70s to 80s on room air. ?Afterwards he was consulted for medicine evaluation in regards to hypoxia. ?Patient likely had hypoxia secondary to his COPD from chronic smoking history since age of 20, consisting of 1 pack/day. ?His PFTs from May 2019 revealed mild obstruction with FEV1 over FVC 69% and normal TLC with no bronchodilator response noted. ?Patient is being discharged with home oxygen but will receive trilogy machine once it is available and approved by his insurance, he has the contact information at hand.???In the past he has had a BIPAP at home, but due to noncompliance he?returned it?a year ago.? This admission,?I have scheduled for sleep study to be conducted?as outpatient;?essentially it would be best for patient to receive CPAP. ??He was also started on Zosyn in the ED for meeting criteria for severe sepsis but only received 1 dose after  signs of sepsis were left evident. ?  ?   For his Cushing syndrome with adrenal adenoma status post adrenalectomy, patient is to continue with hydrocortisone taper, 12.5 every morning and 7.5 every evening.  ?   On day of discharge, patient was noted to have some serosanguinous drainage from left adrenalectomy site. ?Spoke with surgery, this reaccumulation of fluid is expected, and they will follow up with him in clinic on Thursday 06/25 at 11 AM. ?No imaging required prior to discharge.?  ?  Time Spent on discharge  >35 minutes  Objective  Vitals & Measurements  T:?36.6? ?C (Oral)? TMIN:?36.5? ?C (Oral)? TMAX:?37? ?C (Oral)? HR:?74(Monitored)? HR:?74(Peripheral)? RR:?19? BP:?146/96? SpO2:?94%?  Physical Exam  General:?well-developed well-nourished in no acute distress  Eye: no scleral icterus, EOMI  HENT:?normocephalic, atraumatic  Respiratory:?mild expiratory wheezing bilaterally; sitting on side of bed comfortably; on 2 L NC  Cardiovascular:?regular rate and rhythm without murmurs, gallops or rubs; no peripheral edema  Gastrointestinal:?soft, mildly tender to palpation; abdominal binder in place  Musculoskeletal:?full range of motion of all extremities/spine without limitation or discomfort  Neurologic:? no signs of peripheral neurological deficit, motor/sensory function intact  ?  Patient Discharge Condition  clinically and hemodynamically stable  Discharge Disposition  ensure patient followed up with gen surg after discharge and had abdominal wound reevalauted  refill nicotine patches if needed  ensure patient has received trilogy at home  ensure patient has conducted or will conduct sleep study   Discharge Medication Reconciliation  Prescribed  nicotine (nicotine 14 mg/24 hr transdermal film, extended release)?1 patch(es), TD, Daily  Continue  acetaminophen-HYDROcodone (Norco 5 mg-325 mg oral tablet)?1 tab(s), Oral, q4hr, PRN for pain  amLODIPine (amLODIPine 5 mg oral tablet)?5 mg, Oral, Daily  atorvastatin  (atorvastatin 40 mg oral tablet)?40 mg, Oral, Daily  buPROPion (buPROPion 150 mg/24 hours (XL) oral tablet, extended release)?150 mg, Oral, Daily  cephalexin (Keflex 500 mg oral capsule)?500 mg, Oral, QID  docusate (Colace 100 mg oral capsule)?100 mg, Oral, BID, PRN for constipation  escitalopram (Lexapro 20 mg oral tablet)?20 mg, Oral, qAM  hydrocortisone 12.5 in AM, hydrocortisone 7.5 every evening  lisinopril (lisinopril 40 mg oral tablet)?See Instructions  metFORMIN (metformin 500 mg oral tablet)?See Instructions  metoprolol (metoprolol succinate 25 mg oral capsule, extended release)?25 mg, Oral, Daily  omeprazole (omeprazole 40 mg oral DR capsule)?40 mg, Oral, Daily  paliperidone (paliperidone 9 mg oral tablet, extended release)?9 mg, Oral, qAM  traZODone (traZODone 100 mg oral tablet)?100 mg, Oral, BID  Education and Orders Provided  Hematoma, Easy-to-Read  Abdominal Pain, Adult  Discharge - 06/21/20 16:50:00 CDT, Home?  Discharge - 06/21/20 16:51:00 CDT, Home?  Discharge - 06/23/20 13:39:00 CDT, Home?  Follow up  [facility]  ????  follow up in surgery clinic on Thursday 6/25/2020  ?  ?   Report to Emergency Department if symptoms return or worsen  ?  ?  f/u with firm 2 week of july 5 with dr farrell or dr ross for post wards      Pt seen and D/W medicine residents on morning rounds  Also D/W Ms Sue  Pt to F/U with Gen Surg in clinic tomorrow  Agree with discharge plan

## 2022-05-05 NOTE — HISTORICAL [facility]
This is a historical note converted from [facility]. Formatting and pictures may have been removed.  Please reference Edith for original formatting and attached multimedia. Chief Complaint  pt reports to the ed c/o pain/redness to surgical wound on left-upper abdomen (x)1 week, afebrile at this time, no active ?bleeding or drainage noted, vss..  History of Present Illness  59-year-old  male who presented to the ED for reevaluation of wound following open left adrenalectomy (6/10/2020).? Patient was seen by surgery and noted to have seroma which was evacuated at bedside.? Patient was subsequently noted to have oxygen saturations ranging from mid 70s to 80s on room air.? Patient was placed on 2 L nasal cannula which left him in the range of 80s to 90%.? Patient had no complaints of shortness of breath, recent sick contacts, fevers, chills, night sweats, chest pains, abdominal pains, nausea, vomiting or sudden sweating.? Chest x-ray was unremarkable at that time.  ?  In the ED:?Patient had noted oxygen saturation dropped down to 76% on RA at?one point.? Patient has ranged from % on 2 L nasal cannula.  Vitals: Temp 37.1, , RR 24, SPO2 94% RA, /71  Admission Reason:?Hypoxia?and severe sepsis  ?  PMH: Adrenal adenoma, diabetes mellitus type 2, bipolar, hypertension,?schizophrenia, obesity  SurgHx:?Left adrenalectomy (6/10/2020)  FmHx:?Father-heart disease;?grandmother-bone cancer; brother-lung cancer;?sister-bipolar  Social Hx:?Tobacco?1 PPD. ?Quit alcohol?x6 years.? Previous drug abuse (cocaine, methamphetamines,?ecstasy,?etc.); quit at age 34  Allergies:?NKDA  Review of Systems  14 point review of systems negative except for HPI  Physical Exam  Vitals & Measurements  T:?37.1? ?C (Oral)? HR:?81(Peripheral)? RR:?18? BP:?127/80? SpO2:?92%? HT:?167?cm? WT:?92?kg? WT:?92?kg?  General:?well-developed well-nourished in no acute distress  Eye: PERRLA, EOMI, clear conjunctiva, eyelids normal. ?No scleral  icterus noted.  HENT:?Head-normocephalic?and atraumatic  Neck: full range of motion, no?lymphadenopathy, trachea midline, supple  Respiratory:?No wheezes rales or rhonchi noted.??Clear to auscultation bilaterally.  Cardiovascular:?regular rate and NSR without murmurs. ?No gallops or rubs. ?No JVD. ?Capillary refill within normal limits.? Pulses 2+ bilaterally.  Gastrointestinal:?soft, tender?(especially over incision site), distended/protuberant with normal bowel sounds, without masses to palpation.? Abdomen fully wrapped?with?serosanguineous fluid leakage noted in the left?upper quadrant.  Genitourinary: no CVA tenderness to palpation  Musculoskeletal:?full range of motion of all extremities/spine without limitation or discomfort  Integumentary: no rashes or skin lesions present. ?No jaundice noted.  Neurologic: no signs of peripheral neurological deficit, motor/sensory function intact  Psychiatric: ?alert and oriented, cognitive function intact, cooperative with exam, good eye contact, judgement and insight intact, mood and affect full range.  Assessment/Plan  Acute on chronic hypoxemia  Emphysema-confirmed on CT  COPD  History of tobacco abuse  -SPO2 ranging between 76-98% on RA and ranging between % on 2 L NC.  -Patient had no complaints of shortness of breath  -Continuing on 2 L nasal cannula overnight; provided duo nebs PRN?for worsening shortness of breath?despite noted?PFT results as below  -Most recent PFTs 5/2019 showed mild obstruction with FEV1/FVC 69% and normal TLC.? There is no significant bronchodilator response noted.? Diffusion was moderately reduced at that time.  -Consulting case management for [employer] machine  -Patient likely has COPD from chronic smoking history since age 20 (1 pack/day since age 20; 39-pack-year history); smoking cessation counseling performed  -Continue with nicotine patch 21 mg while inpatient; patient requesting information on smoking cessation  -Most recent  echocardiogram LVEF 59% without diastolic dysfunction?(2018)  -Patient will need?outpatient pulmonary follow-up?and home oxygen  -We recommend?Chantix (smoking cessation has been discussed with patient and he agreed to Chantix trial), but patient will need psychiatric consult for medication management prior to?starting Chantix as there may be drug-drug interaction?with current antipsychotic medications  ?  Severe sepsis  -On arrival SIRS?2/4+:?RR 24,  was suspected source incision site and lactic acidosis (2.5)  -Patient started on Zosyn in ED  -Surgery consulted for evacuation of noted seroma  -Patient has no elevated white count and is afebrile  -Discontinue Zosyn after 1 dose; will restart antibiotics if patient begins to show signs of infective process  ?  Hypomagnesemia  -Magnesium 1.5 on arrival  -Provided 2 g magnesium sulfate  -Follow-up labs in the morning?  ?  Diabetes mellitus type 2  -Last A1c 7.1 (3/11/2020)  -Continuing on low-dose SSI while inpatient  ?  Hypertension-controlled  -/80  -Continue amlodipine 5?daily,?metoprolol 25 succinate daily and lisinopril 40 daily  ?  Cushing syndrome  Adrenal adenoma-s/p?open?left?adrenalectomy?(6/10/2020)  -Continuing on with?hydrocortisone taper?(12.5mg every morning and?7.5 mg?every evening)  -Patient being followed by endocrine; endocrine consult placed  -Monitor electrolytes with CMP in the morning  -Given presence of ventral?hernia on CT?over incision site area: recommend consulting surgery for?further evaluation?in the morning  ?  Schizophrenia  Bipolar  -Contacted pharmacy for?paliperidone?equivalent?(nonformulary); awaiting?pharmacy recommendations?in morning  -We will probably switch to risperidone in the morning  -Continue Lexapro?and trazodone  ?  History of abdominal aortic aneurysm  -3 cm on abdominal CT?in December 2019  -Being followed by PCP  ?  DVT ppx:?Lovenox 40  GI ppx:?Protonix 40 daily  Nutrition: Low-sodium  diet  Fluids:?None  I/O:?Strict  Lines:?Peripheral  Abx:?None  Consults:?Endocrine, case management  ?  Code Status:?Full code   Problem List/Past Medical History  Ongoing  Adrenal adenoma  Bipolar  Diabetes mellitus  Hypertension  Obesity  Schizophrenia  Tobacco user  Historical  No qualifying data  Procedure/Surgical History  Adrenalectomy (Left) (06/10/2020)  Resection of Left Adrenal Gland, Open Approach (06/10/2020)  pt denies   Medications  amlodipine 5 mg oral tablet, 5 mg= 1 tab(s), Oral, Daily  amLODIPine 5 mg oral tablet, 5 mg= 1 tab(s), Oral, Daily  atorvastatin 40 mg oral tablet, 40 mg= 1 tab(s), Oral, Daily, 3 refills,? ?Not taking  blood pressure cuff, See Instructions,? ?Unable to obtain  buPROPion 150 mg/24 hours (XL) oral tablet, extended release, 150 mg= 1 tab(s), Oral, Daily,? ?Not taking: dose presently 150mg po qhs Last Dose Date/Time Unknown  Colace 100 mg oral capsule, 100 mg= 1 cap(s), Oral, BID, PRN  Colace 100 mg oral capsule, 100 mg= 1 cap(s), Oral, BID, PRN  Dextrose 50% and Water (50 mL vial/syringe), 12.5 gm= 25 mL, IV Push, Once, PRN  Dextrose 50% and Water (50 mL vial/syringe), 12.5 gm= 25 mL, IV Push, As Directed, PRN  Dextrose 50% and Water (50 mL vial/syringe), 25 gm= 50 mL, IV Push, As Directed, PRN  Dextrose 50% in Water intravenous solution, 12.5 gm= 25 mL, IV Push, As Directed, PRN  escitalopram 10 mg oral tablet, 20 mg= 2 tab(s), Oral, qAM  glucagon recombinant 1 mg injection, 1 mg= 1 EA, IM, q10min, PRN  glucagon recombinant 1 mg injection, 1 mg= 1 EA, IM, q10min, PRN  glucometer, See Instructions  glucose 40% oral gel, 15 gm= 0.5 tube(s), Oral, As Directed, PRN  hydrocortisone, 5 mg= 0.5 tab(s), Oral, qPM  hydrocortisone 10 mg oral tablet, 2.5 mg, Oral, qAM  hydrocortisone 10 mg oral tablet, 10 mg= 1 tab(s), Oral, qAM, 2 refills  hydrocortisone 5 mg oral tablet, See Instructions, 1 refills  hydrocortisone 5 mg oral tablet, 5 mg= 1 tab(s), Oral, qPM, 2 refills  insulin lispro  100 units/mL subcutaneous injection, 2-14 units, Subcutaneous, As Directed, PRN  Keflex 500 mg oral capsule, 500 mg= 1 cap(s), Oral, QID  Lactated Ringers 1000ml 1,000 mL, 1000 mL, IV  lancets, strips, alcohol swabs, See Instructions, 11 refills  Lexapro 20 mg oral tablet, 20 mg= 1 tab(s), Oral, qAM  lisinopril, 40 mg= 4 tab(s), Oral, Daily  lisinopril 40 mg oral tablet, See Instructions, 3 refills  Lovenox, 40 mg= 0.4 mL, Subcutaneous, Daily  metformin 500 mg oral tablet, See Instructions, 3 refills  metoprolol succinate 25 mg oral capsule, extended release, 25 mg= 1 cap(s), Oral, Daily, 3 refills  metoprolol succinate 25 mg oral tablet, extended release, 25 mg= 1 tab(s), Oral, Daily  Norco 5 mg-325 mg oral tablet, 1 tab(s), Oral, q4hr, PRN  omeprazole 40 mg oral DR capsule, 40 mg= 1 cap(s), Oral, Daily, 3 refills  paliperidone 3 mg oral tablet, extended release, 3 mg, Oral, qPM  paliperidone 3 mg oral tablet, extended release, 3 mg= 1 tab(s), Oral, qPM  paliperidone 9 mg oral tablet, extended release, 9 mg, Oral, qAM  paliperidone 9 mg oral tablet, extended release, 9 mg= 1 tab(s), Oral, qAM  Pantoprazole 40 mg ORAL EC-Tablet, 40 mg= 1 tab(s), Oral, Daily  traZODone 100 mg oral tablet, 100 mg= 1 tab(s), Oral, BID  traZODONE 150 mg oral tab ( Desyrel ), 100 mg= 2 tab(s), Oral, BID  Zosyn, 3.375 gm= 50 mL, IV Piggyback, q8hr  Allergies  No Known Medication Allergies  Social History  Abuse/Neglect  No, 06/21/2020  No, No, Yes, 06/10/2020  No, No, Yes, 06/08/2020  No, 06/04/2020  No, No, Yes, 03/11/2020  Alcohol - Denies Alcohol Use, 04/01/2015  Past, 06/04/2020  Past, Beer, Wine, Liquor, 01/10/2020  Employment/School  Disabled, 01/10/2020  Exercise  Exercise frequency: 3-4 times/week. Exercise type: Walking, Bicycle., 03/11/2020  Home/Environment  Lives with Alone., 03/11/2020  Nutrition/Health  Diabetic, Low fat, 03/11/2020  Substance Use - Denies Substance Abuse, 04/01/2015  Past, Marijuana, 03/11/2020  Tobacco  10  or more cigarettes (1/2 pack or more)/day in last 30 days, No, 06/21/2020  10 or more cigarettes (1/2 pack or more)/day in last 30 days, No, 06/10/2020  10 or more cigarettes (1/2 pack or more)/day in last 30 days, No, 06/08/2020  10 or more cigarettes (1/2 pack or more)/day in last 30 days, No, 06/04/2020  10 or more cigarettes (1/2 pack or more)/day in last 30 days, Cigars, No, 06/02/2020  Family History  Bipolar: Sister.  Heart disease: Father.  Primary malignant neoplasm of bone: Grandmother.  Primary malignant neoplasm of lung: Brother.  Health Maintenance  Health Maintenance  ???Pending?(in the next year)  ??? ??OverDue  ??? ? ? ?Diabetes Maintenance-Fasting Lipid Profile due??03/05/19??and every 1??year(s)  ??? ? ? ?Colorectal Screening due??09/19/19??and every 1??year(s)  ??? ? ? ?Lung Cancer Screening due??09/25/19??and every 1??year(s)  ??? ? ? ?Diabetes Maintenance-Eye Exam due??05/21/20??and every 1??year(s)  ??? ? ? ?ADL Screening due??05/22/20??and every 1??year(s)  ??? ??Due?  ??? ? ? ?Hypertension Management-Education due??06/21/20??and every 1??year(s)  ??? ??Due In Future?  ??? ? ? ?Diabetes Maintenance-Urine Dipstick not due until??10/01/20??and every 1??year(s)  ??? ? ? ?Aspirin Therapy for CVD Prevention not due until??10/02/20??and every 1??year(s)  ??? ? ? ?Alcohol Misuse Screening not due until??01/01/21??and every 1??year(s)  ??? ? ? ?Obesity Screening not due until??01/01/21??and every 1??year(s)  ??? ? ? ?Smoking Cessation not due until??01/01/21??and every 1??year(s)  ??? ? ? ?Diabetes Maintenance-Foot Exam not due until??03/11/21??and every 1??year(s)  ??? ? ? ?Diabetes Maintenance-HgbA1c not due until??03/11/21??and every 1??year(s)  ???Satisfied?(in the past 1 year)  ??? ??Satisfied?  ??? ? ? ?Alcohol Misuse Screening on??01/10/20.??Satisfied by Zandra Gillis LPN  ??? ? ? ?Aspirin Therapy for CVD Prevention on??10/02/19.??Satisfied by Clark Paul LPN  ??? ? ? ?Blood  Pressure Screening on??06/21/20.??Satisfied by Florian Faust RN  ??? ? ? ?Body Mass Index Check on??06/21/20.??Satisfied by Bienvenido Talbot RN  ??? ? ? ?COPD Maintenance-Spirometry on??06/13/20.??Satisfied by Moni Moss LPN  ??? ? ? ?Depression Screening on??06/02/20.??Satisfied by Keila Toney LPN P. P.  ??? ? ? ?Diabetes Maintenance-Serum Creatinine on??06/21/20.??Satisfied by Keoynna Gomes  ??? ? ? ?Diabetes Screening on??06/21/20.??Satisfied by Keyonna Gomes  ??? ? ? ?Hypertension Management-Blood Pressure on??06/21/20.??Satisfied by Florian Faust RN  ??? ? ? ?Influenza Vaccine on??10/02/19.??Satisfied by Clark Paul LPN  ??? ? ? ?Obesity Screening on??06/21/20.??Satisfied by Bienvenido Talbot RN  ??? ? ? ?Smoking Cessation on??06/13/20.??Satisfied by Moni Moss LPN  ?  Lab Results  Labs Last 24 Hours?  ?Chemistry? Hematology/Coagulation? Blood Gases?   Sodium Lvl: 138 mmol/L (06/21/20 14:15:00) PT: 12.4 second(s) (06/21/20 14:15:00) Sample ABG: arterial (06/21/20 21:20:00)   Potassium Lvl: 4.1 mmol/L (06/21/20 14:15:00) INR: 0.96 (06/21/20 14:15:00) Treatment: room air (06/21/20 21:20:00)   Chloride: 100 mmol/L (06/21/20 14:15:00) PTT: 26.6 second(s) (06/21/20 14:15:00) Site: Radial Lt (06/21/20 21:20:00)   CO2: 31 mmol/L (06/21/20 14:15:00) WBC: 10.6 x10(3)/mcL (06/21/20 14:15:00) pH Art: 7.36 (06/21/20 21:20:00)   Calcium Lvl: 8.7 mg/dL (06/21/20 14:15:00) RBC: 5.29 x10(6)/mcL (06/21/20 14:15:00) pCO2 Art:?67 mmHg?Critical (06/21/20 21:20:00)   Magnesium Lvl:?1.5 mg/dL?Low (06/21/20 14:15:00) Hgb: 15.1 gm/dL (06/21/20 14:15:00) pO2 Art:?54 mmHg?Critical (06/21/20 21:20:00)   Glucose Lvl:?233 mg/dL?High (06/21/20 14:15:00) Hct: 47.7 % (06/21/20 14:15:00) HCO3 Art:?37.9 mmol/L?High (06/21/20 21:20:00)   BUN: 9 mg/dL (06/21/20 14:15:00) Platelet: 324 x10(3)/mcL (06/21/20 14:15:00) CO2 Totl Art:?40 mmol/L?High (06/21/20 21:20:00)   Creatinine: 1 mg/dL (06/21/20  14:15:00) MCV: 90.2 fL (20 14:15:00) O2 Sat Art: 90.9 % (20:20:00)   eGFR-AA: 99 mL/min (20 14:15:00) MCH: 28.5 pg (20 14:15:00) D base:?9.5?High (20:20:00)   eGFR-SAMI:?82 mL/min?Low (20 14:15:00) MCHC: 31.7 gm/dL (20 14:15:00) THB AB.5 gm/dL (20::00)   Bili Total: 0.2 mg/dL (20 14:15:00) RDW:?15.4 %?High (20 14:15:00) CO Hgb: 6.4 % (20::00)   Bili Direct: 0.1 mg/dL (20 14:15:00) MPV: 9 fL (20 14:15:00) Met Hgb Art: 0.9 % (20:20:00)   Bili Indirect: 0.1 mg/dL (20 14:15:00) Abs Neut: 7.32 x10(3)/mcL (20 14:15:00) O2 Hgb:?84.3 %?Low (20::00)   AST:?9 unit/L?Low (20 14:15:00) Neutro Auto: 69 % (20 14:15:00) Allens: Positive (20:20:00)   ALT: 20 unit/L (20 14:15:00) Lymph Auto: 17 % (20 14:15:00) FIO2 AB% (20:20:00)   Alk Phos:?137 unit/L?High (20 14:15:00) Mono Auto: 9 % (20 14:15:00)    Total Protein: 6.9 gm/dL (20 14:15:00) Eos Auto: 2 % (20 14:15:00)    Albumin Lvl:?2.9 gm/dL?Low (20 14:15:00) Abs Eos: 0.2 x10(3)/mcL (20 14:15:00)    Globulin:?4 gm/mL?High (20 14:15:00) Basophil Auto: 1 % (20 14:15:00)    A/G Ratio:?0.7 ratio?Low (20 14:15:00) Abs Neutro: 7.32 x10(3)/mcL (20 14:15:00)    Phosphorus: 3.6 mg/dL (20 14:15:00) Abs Lymph: 1.8 x10(3)/mcL (20 14:15:00)    Lactic Acid Lvl: 1.6 mmol/L (20 17:15:00) Abs Mono: 1 x10(3)/mcL (20 14:15:00)     Abs Baso: 0.1 x10(3)/mcL (20 14:15:00)     IG%: 2 % (20 14:15:00)     IG#: 0.19 (20 14:15:00)    Diagnostic Results  CTA chest w/ contrast PE protocol:  Impression:  1. No evidence of pulmonary embolism.  2. Mild to moderate emphysema.  3. Diffuse hepatic steatosis.  4. Large incompletely visualized ventral hernia in the left upper  quadrant.  ?  CXR:?  IMPRESSION:  No active or adverse change       PGY III?addendum.  Patient seen and examined with intern; all lab findings, images and chart documentation were reviewed.?  ?  ?  This is a 59 y.o WM admitted for hypoxia; noted to have O2 saturations in the 70s after being evaluated in the ED earlier today for I&D of LUQ abdominal wall seroma by the surgery service. Pt was cleared for d/c home when he was noted to have O2 level of 76% on RA. He does have an extensive smoking history. Also tells me that he was on CPAP last year. Medicine was consulted for admission. CT PE was done which was negative for PE. ABGs reveal hypercapnia and hypoxemia- attribute elevated CO2 level to be secondary to chronic retention from COPD ( did have PFTs in the past which showed mild obstruction and reduced DLCO). Pt?s O2 levels improved with supp oxygenation. Overall plan at this point is to continue supp oxygenation, case mgmt. consulted for assistance with a Trilogy device as pt can benefit from that greatly. Additionally, he did have a partial adrenalectomy and has been on a steroid taper over the last 2 weeks; will continue his Prednisone 7.5mg and recommend discussion with endocrinology. He does not appear toxic or septic on exam; will forego broad spectrum abx; however, he does need wound care and surgery service should follow along to see if that seroma wound site is healing as expected.?  Is interested in smoking cessation. Discuss pts antipsychotic meds with psych so that Chantix can be added on as there is drug-drug interaction.?  ?   Today: Patient is resting in bed comfortably; now 93% on RA. Previously requiring 2 L NC. Does not have a BIPAP at the moment, therefore contacting case management for [employer] machine prior to discharge. ?Otherwise no acute changes overnight. ?. ?Also, patient is noncompliant with BIPAP. ?Likely requiring supplemental oxygen at home due to underlying COPD (given he has been smoking 1 PPD for the past apprxoimately 40 years.?  ABG demonstrated  hypercapnic hypoxic respiratory failure. ?Continue current care and management  ?   Patient seen and discussed with medicine residents on morning rounds in the emergency department  He states that he has?not been using his BiPAP machine-agree with arranging Trilogy?prior to discharge  Well continue to monitor-await?discharge per case management

## 2022-05-05 NOTE — HISTORICAL [facility] C[facility]ER
This is a historical note converted from [facility]. Formatting and pictures may have been removed.  Please reference [employer] for original formatting and attached multimedia. UPDATE H&P  ?  patient states feeling well this morning  no changes since last clinic visit date  denies any nausea, vomiting, fever, chills, SOB  has had chronic SOB due to smoking but nothing acutely new  ?  took psych and HTN medications this morning  denies blood thinners  npo since midnight  ?  has been smoking, last cig yesterday  ?  is here accompanied by sister  all questions answered  ?  OR for L adrenalectomy  ?  Kathya Torres MD  [employer] General Surgery PGY-1

## 2022-05-05 NOTE — HISTORICAL [facility] CERNER
This is a historical note converted from Edith. Formatting and pictures may have been removed.  Please reference Edith for original formatting and attached multimedia. Admit and Discharge Dates  Admit Date: 10/30/2021  Discharge Date: 11/03/2021  Physicians  Attending Physician - Adalberto RIVERA, Carlos LAWSON  Admitting Physician - Adalberto RIVERA, Carlos LAWSON  Primary Care Physician - Everette RIVERA, Juanjose NOEL  Discharge Diagnosis  COPD exacerbation?J44.1  Hypoxemia?R09.02  Shortness of breath?[id number]  Stage 2 moderate COPD by GOLD classification?J44.9  Surgical Procedures  No procedures recorded for this visit.  Immunizations  10/31/2021 - influenza virus vaccine, inactivated?  Admission Information  59yo M with a Hx of COPD?Gold stage II?(last PFTs?10/18/2021)?presented to the [facility] ED?requesting?a prescription for?home oxygen.? Patient states that he?used to be on home oxygen however he let his?prescription?ran out.? He was recently evaluated for?a CABG?that he is supposed to have?in [place]?in November of this year. ?Patient stated he was told?that he needs supplemental oxygen?after that evaluation.? He states he has not seen his PCP?in a very long time.? Patient does report?some?increased shortness of breath?recently. ?However patient denies?any coughing, sputum production,?wheezing, or any recent illnesses. In the ED patients vital signs were significant for tachypnea RR 28 and hypoxia SP 02 86 on room air, patient was also tachycardic to 112, all other vital signs were within normal limits.? Labs were significant for potassium 3.4, glucose 193, erythrocytosis RBC 7.51.? ABG pH 7.39, PCO2 66, PO2 70, HCO3 40 on room air.? Patient was placed on 4 L nasal cannula and oxygen saturations increased to about 8 88.? Patient was given DuoNeb treatment x3, 1 dose of azithromycin, and 1 dose of 6 triaxial, and 1 dose of Solu-Medrol 125 mg.? Internal medicine was consulted to admit for COPD exacerbation.  [facility]  Course  Patient started on 4L nasal canula, which was weaned to 3L with sats maintained between 88-92%.Solu-Medrol 60 mg BID. Antibiotics not indicated during this admission due to?no change in character of sputum production.?DuoNebs q6hr prn. Potassium repleted as appropriate. Home Psych and HTN meds restarted. Patient remained hypertensive during hospitalization and home dose of amlodipine increased to 10mg daily and lisinopril increased to 30mg prior to discharge with new prescription given via meds to bed.?SSI for hyperglycemia. Leukocytosis downtrended and attributed to steroid use. Case management consulted for home oxygen. O2 sats at rest= 89%, 85% with ambulation on room air. Sats improved to 90% with 4L nasal cannula. Advised patient to get pulse oximeter to monitor oxygen requirements. Titrate up/down home oxygen to keep sats between 88-92%. On day of discharge patient at baseline SOB with exertion, requiring 3L nasal cannula to keep sats between 88-92%. Patient prescribed Advair BID and refill of albuterol to optimize COPD. Patient educated on proper use of inhaler.?Prescribed Medrol Dose Pack.?Patient received all new medications via meds to bed. Patient inquiring about hernia repair surgery. Advised patient to call [facility] general surgery cardiac?work up has been completed. Referral has been placed. Recommend PCP to facilitate in future. Referral to [facility] Pulmonology sent given likely worsening COPD stage. Patient to follow up with Cardiologist and IMC for post wards follow up. All questions answered and patient expressed understanding. ED precautions given for increasing worsening breath unrelieved by medication, change in sputum production, and chest pain.  Time Spent on discharge  >35 minutes  Objective  Vitals & Measurements  T:?36.5? ?C (Oral)? TMIN:?36.4? ?C (Oral)? TMAX:?36.8? ?C (Oral)? HR:?67(Peripheral)? RR:?18? BP:?176/98? SpO2:?90%?  Physical Exam  Gen: well appearing, resting in bed comfortably,  NAD  CVS: RRR, no murmurs appreciated, no JVD  Resp: on 3L nc, nonlabored breathing, distant lung sounds diffusely, no wheezing or rhonchi appreciated  Abd: soft, nontender, nondistended, large reproducible circumferential abdominal hernia over left upper quadrant  Ext: no lower extremity edema, 2+ dorsal pedal pulses  Patient Discharge Condition  stable  Discharge Disposition  home?with home oxygen  Attending Physician Addendum  Patient was seen and examined on AM rounds. Management and plan were?discussed with resident. Care was reasonable and necessary.?   Discharge Medication Reconciliation  Prescribed  fluticasone-salmeterol (Advair Diskus 250 mcg-50 mcg inhalation powder)?1 inh, INH, BID  methylPREDNISolone (Medrol Dosepak 4 mg oral tablet)?1 packet(s), Oral, As Directed  amLODIPine (amlodipine 10 mg oral tablet)?10 mg, Oral, Daily  lisinopril (lisinopril 30 mg oral tablet)?30 mg, Oral, Daily  Continue  Misc Prescription (Home Oxygen)?See Instructions  Misc Prescription (glucometer)?See Instructions  Misc Prescription (lancets, strips, alcohol swabs)?See Instructions  Misc Prescription (nebulizer tubing with mouthpiece)?See Instructions  Misc Prescription (nebulizer)?See Instructions  albuterol (albuterol 2.5 mg/3 mL (0.083%) inhalation solution)?2.5 mg, INH, q6hr, PRN shortness of breath or wheezing  amLODIPine (amlodipine 5 mg oral tablet)?See Instructions  aspirin (aspirin 81 mg oral Delayed Release (EC) tablet)?162 mg, Oral, Daily  atorvastatin (atorvastatin 40 mg oral tablet)?See Instructions  buPROPion (buPROPion 12 hour extended release)?150 mg, Oral, Daily  ergocalciferol (Vitamin D2 1.25 mg (50,000 intl units) oral capsule)?See Instructions  escitalopram (Lexapro 20 mg oral tablet)?20 mg, Oral, qAM  metFORMIN (metformin 500 mg oral tablet)?500 mg, Oral, BID  metoprolol (Metoprolol Succinate ER 25 mg oral tablet, extended release)?See Instructions  omeprazole (omeprazole 40 mg oral DR capsule)?40 mg,  Oral, Daily  paliperidone (paliperidone 3 mg oral tablet, extended release)?3 mg, Oral, qPM  paliperidone (paliperidone 9 mg oral tablet, extended release)?9 mg, Oral, qAM  traZODone (traZODone 100 mg oral tablet)?100 mg, Oral, BID  Discontinue  amLODIPine (amlodipine 5 mg oral tablet)?See Instructions  lisinopril (lisinopril 10 mg oral tablet)?10 mg, Oral, Daily  Education and Orders Provided  Chronic Obstructive Pulmonary Disease, Easy-to-Read  Discharge - 11/03/21 5:03:00 CDT, Home, discharge after receiving home O2 and meds to bed ordered 11/3/21?  Follow up  [facility]. Office to call to schedule.  Keep scheduled appotintment with Cardiothoracic Surgeon and Cardiologist., on 11/09/2021  Report to Emergency Department if symptoms return or worsen  Referral to [facility] Pulmonology. Office will call to schedule.  Call [facility] to schedule appointment after Cardiology work-up.

## 2022-05-05 NOTE — HISTORICAL [facility] CERNER
This is a historical note converted from Edith. Formatting and pictures may have been removed.  Please reference Edith for original formatting and attached multimedia. Chief Complaint  c/o sob and non-productive cough that started yesterday. o2 86% RA. Denies chest pain.  History of Present Illness  59yo M with a Hx of COPD?Gold stage II?(last PFTs?10/18/2021)?presented to the [facility] ED?requesting?a prescription for?home oxygen.? Patient states that he?used to be on home oxygen however he let his?prescription?ran out.? He was recently evaluated for?a CABG?that he is supposed to have?in [place]?in November of this year. ?Patient stated he was told?that he needs supplemental oxygen?after that evaluation.? He states he has not seen his PCP?in a very long time.? Patient does report?some?increased shortness of breath?recently. ?However patient denies?any coughing, sputum production,?wheezing, or any recent illnesses.  ?  PMH:?COPD Gold stage II,?CAD, DM,?HTN,?schizophrenia/bipolar  Allergies:?NKDA  Social history:?Patient states he smokes?a pack of?cigarillos?per day?for?30+ years,?no current EtOH use,?quit?5 years ago, previously?trach?6-12 12oz beers per day?for?many years, currently no?illicit drug use, however patient did report?previous history of?polysubstance abuse, quit?15+ years ago  ?  In the ED patients vital signs were significant for tachypnea RR 28 and hypoxia SP 02 86 on room air, patient was also tachycardic to 112, all other vital signs were within normal limits.? Labs were significant for potassium 3.4, glucose 193, erythrocytosis RBC 7.51.? ABG pH 7.39, PCO2 66, PO2 70, HCO3 40 on room air.? Patient was placed on 4 L nasal cannula and oxygen saturations increased to about 8 88.? Patient was given DuoNeb treatment x3, 1 dose of azithromycin, and 1 dose of 6 triaxial, and 1 dose of Solu-Medrol 125 mg.? Internal medicine was consulted to admit for COPD exacerbation.  Review of  Systems  Constitutional:?no fever, fatigue, weakness  ENMT:?no sore throat, ear pain, sinus pain/congestion, nasal congestion/drainage  Respiratory:?no cough, no wheezing,?+ shortness of breath  Cardiovascular:?no chest pain, no palpitations, no edema  Gastrointestinal:?no nausea, vomiting, or diarrhea. No abdominal pain  Genitourinary:?no dysuria, no urinary frequency or urgency, no hematuria  Musculoskeletal:?no muscle or joint pain, no joint swelling  Integumentary:?no skin rash or abnormal lesion  Neurologic: no headache, no dizziness, no weakness or numbness  Physical Exam  Vitals & Measurements  T:?36.8? ?C (Oral)? TMIN:?36.8? ?C (Oral)? TMAX:?37? ?C (Oral)? HR:?92(Peripheral)? RR:?23? BP:?118/78? SpO2:?90%? WT:?84.6?kg? BMI:?29.27?  General:?well-developed well-nourished in no acute distress  HENT:?oropharynx without erythema/exudate, oropharynx and nasal mucosal surfaces moist  Neck: full range of motion, no thyromegaly or lymphadenopathy  Respiratory:?clear to auscultation bilaterally, no wheezing, no crackles,?nonlabored breathing, resting comfortably?on 4 L?nasal cannula  Cardiovascular:?regular rate and rhythm without murmurs, gallops or rubs,?2+ peripheral?pulses, no edema  Gastrointestinal:?soft, non-tender, non-distended with normal bowel sounds, with large?left-sided?abdominal hernia  Musculoskeletal:?full range of motion of all extremities/spine without limitation or discomfort  Integumentary: no rashes or skin lesions present  Assessment/Plan  Hypoxemic respiratory distress  COPD  -Patient on 4 L?nasal cannula?satting?87?to 88%  -Antibiotics not indicated at this time  -Will start?Solu-Medrol 60 mg twice daily  -DuoNebs every?6 hours?as needed  -Continue?supplemental oxygen, wean as necessary to keep?saturations?between 89% and 92%  -Reported needing a prescription for home oxygen  ?  Hypokalemia  -Potassium?3.4 on admission  -Gave patient?60 mEq?oral potassium  -Recheck?BMP in the  morning  ?  Bipolar disorder/schizophrenia  -Follows at?[facility]  -Will continue bupropion, Lexapro,?trazodone  ?  CAD  -Patient recently evaluated?for?CABG, scheduled?for November 2021  -Continue aspirin and aspirin?daily  ?  HTN  -Continue?lisinopril?10 mg,?metoprolol 25 mg  ?  DM  -Last?Hgb A1c?5.9 (9/2021)  -Will hold?home?oral?diabetic medication  -Low-dose?ISS initiated  ?  ?  Thalia Paul MD  [employer] [facility] ?PGY-2  ?  Pt obs by team on call.? I saw the patient with the resident team 10/31/21?and discussed the case, assisted with the development of the assessment and agree with the plan of care in that pt being managed for COPD w/ comorbidities?working to get home O2.? See progress note from 10/31/21 for further details.? 50 minutes or more were spent in pt care.  Carlos Glover MD, FACE   Problem List/Past Medical History  Ongoing  Adrenal adenoma  Bipolar  CAD - Coronary artery disease  COPD - Chronic obstructive pulmonary disease  Diabetes mellitus  Essential hypertension  Hypertension  Obesity  Schizophrenia  Tobacco user  Tobacco user  Tobacco user  Tobacco user  Historical  No qualifying data  Procedure/Surgical History  Catheter placement in coronary artery(s) for coronary angiography, including intraprocedural injection(s) for coronary angiography, imaging supervision and interpretation; with left heart catheterization including intraprocedural injection(s) for left la (08/11/2021)  Fluoroscopy of Left Heart using Low Osmolar Contrast (08/11/2021)  Fluoroscopy of Left Internal Mammary Bypass Graft using Low Osmolar Contrast (08/11/2021)  Measurement of Cardiac Sampling and Pressure, Left Heart, Percutaneous Approach (08/11/2021)  Adrenalectomy (Left) (06/10/2020)  Resection of Left Adrenal Gland, Open Approach (06/10/2020)  pt denies   Medications  Inpatient  acetaminophen, 1000 mg= 2 tab(s), Oral, q6hr, PRN  albuterol 2.5 mg/3 mL (0.083%) inhalation solution, 2.5 mg= 3 mL, INH, q6hr, PRN  aspirin  81 mg oral tablet, CHEWABLE, 162 mg= 2 tab(s), Oral, Daily  buPROPion 24 hour extended release, 150 mg= 1 tab(s), Oral, qAM  Lexapro 10 mg oral tablet, 20 mg= 2 tab(s), Oral, qAM  lisinopril 10 mg oral tablet, 10 mg= 1 tab(s), Oral, Daily  Lovenox, 40 mg= 0.4 mL, Subcutaneous, Daily  metformin 500 mg oral tablet, 500 mg= 1 tab(s), Oral, BID  Metoprolol Succinate ER 25 mg oral tablet extended release, 25 mg= 1 tab(s), Oral, Daily  traZODone 100 mg oral tablet, 100 mg= 1 tab(s), Oral, BID  Home  albuterol 2.5 mg/3 mL (0.083%) inhalation solution, 2.5 mg= 3 mL, INH, q6hr, PRN, 3 refills,? ?Not taking  amlodipine 5 mg oral tablet, See Instructions, 3 refills  aspirin 81 mg oral Delayed Release (EC) tablet, 162 mg= 2 tab(s), Oral, Daily, 3 refills  atorvastatin 40 mg oral tablet, See Instructions, 3 refills  buPROPion 12 hour extended release, 150 mg= 1 tab(s), Oral, Daily  buPROPion 150 mg/24 hours (XL) oral tablet, extended release, 150 mg= 1 tab(s), Oral, qAM  glucometer, See Instructions,? ?Not taking  lancets, strips, alcohol swabs, See Instructions, 11 refills,? ?Not taking  Lexapro 20 mg oral tablet, 20 mg= 1 tab(s), Oral, qAM  lisinopril 10 mg oral tablet, 10 mg= 1 tab(s), Oral, Daily, 3 refills  metformin 500 mg oral tablet, 500 mg= 1 tab(s), Oral, BID, 3 refills  Metoprolol Succinate ER 25 mg oral tablet, extended release, See Instructions, 3 refills  nebulizer, See Instructions,? ?Not taking  nebulizer tubing with mouthpiece, See Instructions, 3 refills,? ?Not taking  omeprazole 40 mg oral DR capsule, 40 mg= 1 cap(s), Oral, Daily, 1 refills  paliperidone 3 mg oral tablet, extended release, 3 mg= 1 tab(s), Oral, qPM  paliperidone 9 mg oral tablet, extended release, 9 mg= 1 tab(s), Oral, qAM  traZODone 100 mg oral tablet, 100 mg= 1 tab(s), Oral, BID  Vitamin D2 1.25 mg (50,000 intl units) oral capsule, See Instructions  Family History  Bipolar: Sister.  Heart disease: Father.  Primary malignant neoplasm of bone:  Grandmother.  Primary malignant neoplasm of lung: Brother.  Immunizations  Vaccine Date Status Comments   zoster vaccine, inactivated 05/18/2021 Given    pneumococcal 13-valent conjugate vaccine 01/26/2021 Given    zoster vaccine, inactivated 09/22/2020 Given [id number]   influenza virus vaccine, inactivated 09/22/2020 Given [id number]   influenza virus vaccine, inactivated 10/02/2019 Given    tetanus/diphtheria/pertussis, acel(Tdap) 09/28/2016 Given    pneumococcal 23-polyvalent vaccine 09/28/2016 Given    influenza virus vaccine, inactivated 09/28/2016 Given [id number]   Lab Results  Labs Last 24 Hours?  ?Chemistry? Hematology/Coagulation? Blood Gases?   Sodium Lvl: 140 mmol/L (10/30/21 16:11:00) WBC: 8.4 x10(3)/mcL (10/30/21 16:11:00) Sample ABG: arterial (10/30/21 16:34:00)   Potassium Lvl:?3.4 mmol/L?Low (10/30/21 16:11:00) RBC:?7.51 x10(6)/mcL?High (10/30/21 16:11:00) Treatment: ncat 2 lpm (10/30/21 16:34:00)   Chloride:?97 mmol/L?Low (10/30/21 16:11:00) Hgb: 16.9 gm/dL (10/30/21 16:11:00) Site: Radial Rt (10/30/21 16:34:00)   CO2: 29 mmol/L (10/30/21 16:11:00) Hct:?56.3 %?High (10/30/21 16:11:00) pH Art: 7.39 (10/30/21 16:34:00)   Calcium Lvl: 10.1 mg/dL (10/30/21 16:11:00) Platelet: 251 x10(3)/mcL (10/30/21 16:11:00) pCO2 Art:?66 mmHg?Critical (10/30/21 16:34:00)   Glucose Lvl:?193 mg/dL?High (10/30/21 16:11:00) MCV:?75 fL?Low (10/30/21 16:11:00) pO2 Art:?70 mmHg?Low (10/30/21 16:34:00)   BUN:?7.8 mg/dL?Low (10/30/21 16:11:00) MCH:?22.5 pg?Low (10/30/21 16:11:00) HCO3 Art:?40 mmol/L?High (10/30/21 16:34:00)   Creatinine: 1.04 mg/dL (10/30/21 16:11:00) MCHC:?30 gm/dL?Low (10/30/21 16:11:00) CO2 Totl Art:?42 mmol/L?High (10/30/21 16:34:00)   Est Creat Clearance Ser: 72.21 mL/min (10/30/21 18:09:07) RDW:?21.4 %?High (10/30/21 16:11:00) O2 Sat Art: 95 % (10/30/21 16:34:00)   eGFR-AA: 94 (10/30/21 16:11:00) MPV: 9.1 fL (10/30/21 16:11:00) D base:?11.3?High (10/30/21 16:34:00)   eGFR-SAMI:?78 mL/min/1.73  m2?Low (10/30/21 16:11:00) Abs Neut: 5.86 x10(3)/mcL (10/30/21 16:11:00) THB ABG:?17 gm/dL?High (10/30/21 16:34:00)   Bili Total: 0.4 mg/dL (10/30/21 16:11:00) Neutro Auto: 70 % (10/30/21 16:11:00) CO Hgb: 7.2 % (10/30/21 16:34:00)   Bili Direct: 0.2 mg/dL (10/30/21 16:11:00) Lymph Auto: 20 % (10/30/21 16:11:00) Met Hgb Art: 0.8 % (10/30/21 16:34:00)   Bili Indirect: 0.2 mg/dL (10/30/21 16:11:00) Mono Auto: 8 % (10/30/21 16:11:00) O2 Hgb:?87.4 %?Low (10/30/21 16:34:00)   AST: 17 unit/L (10/30/21 16:11:00) Eos Auto: 2 % (10/30/21 16:11:00) Allens: Positive (10/30/21 16:34:00)   ALT: 17 unit/L (10/30/21 16:11:00) Abs Eos: 0.2 x10(3)/mcL (10/30/21 16:11:00)    Alk Phos: 133 unit/L (10/30/21 16:11:00) Basophil Auto: 1 % (10/30/21 16:11:00)    Total Protein: 7.2 gm/dL (10/30/21 16:11:00) Abs Neutro: 5.86 x10(3)/mcL (10/30/21 16:11:00)    Albumin Lvl: 3.6 gm/dL (10/30/21 16:11:00) Abs Lymph: 1.6 x10(3)/mcL (10/30/21 16:11:00)    Globulin:?3.6 gm/dL?High (10/30/21 16:11:00) Abs Mono: 0.6 x10(3)/mcL (10/30/21 16:11:00)    A/G Ratio:?1 ratio?Low (10/30/21 16:11:00) Abs Baso: 0 x10(3)/mcL (10/30/21 16:11:00)    BNP: 13.6 pg/mL (10/30/21 16:11:00) NRBC%: 0.0 (10/30/21 16:11:00)    Lactic Acid Lvl: 1.7 mmol/L (10/30/21 17:53:00) IG%: 0 % (10/30/21 16:11:00)    Troponin-I: <0.010 (10/30/21 16:11:00) IG#: 0.03 (10/30/21 16:11:00)    Diagnostic Results  Accession:?[id number]  Order:?XR Chest 2 Views  Report Dt/Tm:?10/30/2021 17:47  Report:?  EXAMINATION  XR Chest 2 Views  ?  INDICATION  Dyspnea, nonproductive cough  ?  Comparison: 21 June, 2020  ?  FINDINGS  Lines/tubes/devices:  ECG leads overlie the chest.  ?  The cardiomediastinal silhouette and central pulmonary vasculature are  unremarkable. Scattered aortic calcification is incidentally noted.  The trachea is midline. There is no lobar consolidation, pleural  effusion, or pneumothorax.  ?  There is no acute osseous or extrathoracic abnormality.  ?  IMPRESSION  No acute  thoracic abnormality.

## 2022-05-05 NOTE — PROGRESS NOTES
CHIEF COMPLAINT:   Chief Complaint   Patient presents with    F/U VISIT, DENIES CHEST PAINS OR SOB                   Review of patient's allergies indicates:  No Known Allergies                                       HPI:           This is a 61-year-old  male with a past medical history of CAD/PCI,  diabetes mellitus, hypertension, HLD, adrenal adenoma status post resection in 2020, and tobacco use who presents for routine follow up and ongoing care.  The patient completed an Echocardiogram that revealed LVEF approximately 55 to 60% (see report below). He had an abnormal Lexiscan stress test on 6.23.21 which revealed a medium-sized area of mild inferior ischemia. Left heart catheterization performed on 8.11.21 showed two-vessel disease, with 70% proximal LAD and proximal RCA lesions.  He underwent subsequent LAD stent performed at [facility] in [facility] 2021. During his last clinic visit, the patient was requesting cardiac risk stratification for a potential hernia repair.  It was recommended for the patient to remain on DAPT for 6 months uninterrupted, with recommendations to hold Plavix for 5 days prior to surgery with continuation of Aspirin therefter.  However if surgery cannot be delayed, it was recommended than 3 months of interrupted DAPT.  Of note, the patient reports that he is awaiting to hear from surgery.     Today the patient denies any chest pain, palpitations, orthopnea, peripheral edema, claudication symptoms, PND, dizziness syncope.  He continues to endorse occasional shortness of breath with exertion.  He states he is able to perform his own ADLs without issue. He continues to smoke approximately a pack a cigarettes a day and states he is not interested in quitting at this time.  He reports compliance with his current medications.     Lexiscan stress test June 23, 2021:  Medium sized area of mild inferior ischemia  No scar    Echocardiogram April 22, 2021:  Ventricular ejection fraction  is measured at approximately 55 to 60%.  Structurally normal mitral valve.  Individual aortic valve leaflets are not clearly visualized  Normal flow velocities across aortic valve.  Trace tricuspid regurgitation with an RVSP of 27 mmHg.  No evidence of pulmonic regurgitation.  Normal size left atrium.  Borderline dilated right atrium.  Mildly enlarged right ventricle.  No evidence of a pericardial effusion.  No evidence of pleural effusion.  The IVC is normal.                                                                                                                                                                                                                                                                                                                                                                                                                                                                               Patient Active Problem List   Diagnosis    Coronary artery disease involving native coronary artery of native heart without angina pectoris    HTN (hypertension), benign    Hyperlipidemia LDL goal <70    Tobacco use    Chronic obstructive pulmonary disease     Past Surgical History:   Procedure Laterality Date    ADRENALECTOMY      ANGIOGRAM, CORONARY, WITH LEFT HEART CATHETERIZATION      FLUOROSCOPY OF LEFT HEART       Social History     Socioeconomic History    Marital status: Single   Tobacco Use    Smoking status: Current Every Day Smoker     Packs/day: 1.00     Years: 15.00     Pack years: 15.00     Types: Cigarettes    Smokeless tobacco: Never Used   Substance and Sexual Activity    Alcohol use: Not Currently    Drug use: Not Currently    Sexual activity: Not Currently        No family history on file.      Current Outpatient Medications:     albuterol (PROVENTIL) 2.5 mg /3 mL (0.083 %) nebulizer solution, Inhale 2.5 mg into the lungs continuous prn., Disp: , Rfl:     amLODIPine  "(NORVASC) 5 MG tablet, Take 5 mg by mouth once daily., Disp: , Rfl:     aspirin (ECOTRIN) 81 MG EC tablet, Take 162 mg by mouth once daily., Disp: , Rfl:     atorvastatin (LIPITOR) 80 MG tablet, Take 80 mg by mouth once daily., Disp: , Rfl:     buPROPion (WELLBUTRIN XL) 150 MG TB24 tablet, Take 150 mg by mouth once daily., Disp: , Rfl:     clopidogreL (PLAVIX) 75 mg tablet, Take 75 mg by mouth once daily., Disp: , Rfl:     ergocalciferol (ERGOCALCIFEROL) 50,000 unit Cap, Take 50,000 Units by mouth once daily., Disp: , Rfl:     EScitalopram oxalate (LEXAPRO) 20 MG tablet, Take 20 mg by mouth once daily., Disp: , Rfl:     fluticasone-salmeterol diskus inhaler 100-50 mcg, Inhale 1 puff into the lungs once daily., Disp: , Rfl:     lisinopriL 10 MG tablet, Take 10 mg by mouth once daily., Disp: , Rfl:     metFORMIN (GLUCOPHAGE) 500 MG tablet, Take 500 mg by mouth once daily., Disp: , Rfl:     metoprolol succinate (TOPROL-XL) 25 MG 24 hr tablet, Take 25 mg by mouth once daily., Disp: , Rfl:     paliperidone (INVEGA) 3 MG TR24, Take 3 mg by mouth once daily., Disp: , Rfl:     traZODone (DESYREL) 100 MG tablet, Take 100 mg by mouth once daily., Disp: , Rfl:      ROS:                                                                                                                                                                             Review of Systems   Constitutional: Negative.    HENT: Negative.    Eyes: Negative.    Respiratory: Positive for shortness of breath.    Cardiovascular: Negative.    Gastrointestinal: Negative.    Genitourinary: Negative.    Musculoskeletal: Negative.    Skin: Negative.    Neurological: Negative.    Endo/Heme/Allergies: Negative.    Psychiatric/Behavioral: Negative.         Blood pressure 100/68, pulse (!) 113, temperature 98.2 °F (36.8 °C), resp. rate (!) 24, height 5' 6.93" (1.7 m), weight 79 kg (174 lb 2.6 oz), SpO2 95 %.   PE:  Physical Exam  Constitutional:       Appearance: " Normal appearance.   HENT:      Head: Normocephalic.   Cardiovascular:      Rate and Rhythm: Normal rate and regular rhythm.   Abdominal:      Hernia: A hernia is present.   Musculoskeletal:         General: Normal range of motion.   Neurological:      General: No focal deficit present.      Mental Status: He is alert.   Psychiatric:         Mood and Affect: Mood normal.         Behavior: Behavior normal.        ASSESSMENT/PLAN:  CAD  - S/p LAD stent [facility] YESSICA  ([facility]- Dec. 2021)  - LVEF-55-60% per ECHO 4.22.21  - Denies CP. Reports stable SOB  - Left heart catheterization 8/11/2021: 70% proximal LAD and proximal RCA lesions  - Lexiscan Stress Test 6/23/21: Medium sized area of mild inferior ischemia  - Continue aspirin, metoprolol succinate, amlodipine , and atorvastatin  - Counseled on heart healtly, low cholesterol diet and activity as directed     Hypertension- at goal  - Continue current medication regimen  - Counseled on low sodium diet     Diabetes mellitus  - A1C- 5.9  - Management per PCP    HLD  - LDL at goal-57  - Continue Atorvastatin 40mg daily  - Counseled on low cholesterol, low fat diet and activity as tolerated    Tobacco Use  - Currently smoking pack of cigarettes per day and states is not ready to quit at this time  - Counseled on importance of smoking cessation. Offered smoking cessation program    COPD  - Reports stable SOB  - Continue management per PCP      Follow up in Cardiology clinic in 4 months with FLP/CMP or sooner if needed   Follow up with PCP/pulmonology clinic as directed

## 2022-05-12 ENCOUNTER — LAB VISIT (OUTPATIENT)
Dept: LAB | Facility: HOSPITAL | Age: 62
End: 2022-05-12
Attending: STUDENT IN AN ORGANIZED HEALTH CARE EDUCATION/TRAINING PROGRAM
Payer: MEDICAID

## 2022-05-12 ENCOUNTER — OFFICE VISIT (OUTPATIENT)
Dept: INTERNAL MEDICINE | Facility: CLINIC | Age: 62
End: 2022-05-12
Payer: MEDICAID

## 2022-05-12 VITALS
HEIGHT: 67 IN | DIASTOLIC BLOOD PRESSURE: 71 MMHG | WEIGHT: 182.31 LBS | TEMPERATURE: 98 F | RESPIRATION RATE: 18 BRPM | SYSTOLIC BLOOD PRESSURE: 111 MMHG | HEART RATE: 96 BPM | BODY MASS INDEX: 28.61 KG/M2

## 2022-05-12 DIAGNOSIS — Z00.00 HEALTHCARE MAINTENANCE: ICD-10-CM

## 2022-05-12 DIAGNOSIS — I25.10 CORONARY ARTERY DISEASE INVOLVING NATIVE CORONARY ARTERY OF NATIVE HEART WITHOUT ANGINA PECTORIS: Primary | ICD-10-CM

## 2022-05-12 DIAGNOSIS — E78.5 HYPERLIPIDEMIA LDL GOAL <70: ICD-10-CM

## 2022-05-12 DIAGNOSIS — Z72.0 TOBACCO USE: ICD-10-CM

## 2022-05-12 DIAGNOSIS — I10 HTN (HYPERTENSION), BENIGN: ICD-10-CM

## 2022-05-12 DIAGNOSIS — R91.1 SOLITARY PULMONARY NODULE: ICD-10-CM

## 2022-05-12 DIAGNOSIS — J44.9 CHRONIC OBSTRUCTIVE PULMONARY DISEASE, UNSPECIFIED COPD TYPE: ICD-10-CM

## 2022-05-12 LAB
ALBUMIN SERPL-MCNC: 3.6 GM/DL (ref 3.5–5)
ALBUMIN/GLOB SERPL: 1.1 RATIO (ref 1.1–2)
ALP SERPL-CCNC: 130 UNIT/L (ref 40–150)
ALT SERPL-CCNC: 13 UNIT/L (ref 0–55)
AST SERPL-CCNC: 18 UNIT/L (ref 5–34)
BILIRUBIN DIRECT+TOT PNL SERPL-MCNC: 0.3 MG/DL
BUN SERPL-MCNC: 10.5 MG/DL (ref 8.4–25.7)
CALCIUM SERPL-MCNC: 9.4 MG/DL (ref 8.4–10.2)
CHLORIDE SERPL-SCNC: 101 MMOL/L (ref 98–107)
CHOLEST SERPL-MCNC: 148 MG/DL
CHOLEST/HDLC SERPL: 5 {RATIO} (ref 0–5)
CO2 SERPL-SCNC: 27 MMOL/L (ref 22–29)
CREAT SERPL-MCNC: 0.76 MG/DL (ref 0.73–1.18)
GLOBULIN SER-MCNC: 3.4 GM/DL (ref 2.4–3.5)
GLUCOSE SERPL-MCNC: 107 MG/DL (ref 74–100)
HDLC SERPL-MCNC: 31 MG/DL (ref 35–60)
HIV 1+2 AB+HIV1 P24 AG SERPL QL IA: NONREACTIVE
LDLC SERPL CALC-MCNC: 60 MG/DL (ref 50–140)
POTASSIUM SERPL-SCNC: 4.5 MMOL/L (ref 3.5–5.1)
PROT SERPL-MCNC: 7 GM/DL (ref 6.4–8.3)
SODIUM SERPL-SCNC: 138 MMOL/L (ref 136–145)
TRIGL SERPL-MCNC: 287 MG/DL (ref 34–140)
VLDLC SERPL CALC-MCNC: 57 MG/DL

## 2022-05-12 PROCEDURE — 36415 COLL VENOUS BLD VENIPUNCTURE: CPT

## 2022-05-12 PROCEDURE — 86780 TREPONEMA PALLIDUM: CPT

## 2022-05-12 PROCEDURE — 83718 ASSAY OF LIPOPROTEIN: CPT

## 2022-05-12 PROCEDURE — 87389 HIV-1 AG W/HIV-1&-2 AB AG IA: CPT

## 2022-05-12 PROCEDURE — 99214 OFFICE O/P EST MOD 30 MIN: CPT | Mod: PBBFAC

## 2022-05-12 PROCEDURE — 80053 COMPREHEN METABOLIC PANEL: CPT

## 2022-05-12 NOTE — PROGRESS NOTES
"[facility] Clinic Visit    Chief Complaint:      Follow-up     Subjective:     HPI:  Benjie Patterson is a 61 y.o. male White Male smoker with a PMH of schizophrenia, diabetes mellitus, hypertension, HLD, adrenal adenoma status post resection in 2020,COPD Gold stage II (last PFTs 10/18/2021)who presents to clinic today for follow-up of chronic medical conditions.  Of note patient is schizophrenic however he is compliant with his medication and is followed by [facility].  Colonoscopy ordered last visit still waiting appointment.  Patient was scheduled for possible CABG the last time soft patient however had car trouble, patient's appointment was rescheduled and he was able to go to [place] for procedure stent ended up being placed.    Today, patient has no complaints states his shortness of breath is at baseline still current on 3 L nasal cannula at home.  Patient was re-evaluated for hernia surgery approximately 3 months ago however secondary to recent stent placement and requirement for dual anti-platelet therapy, patient will not be a candidate until likely August, this was discussed with patient he is amenable to this.  Will discuss that next visit with him possibility for hernia surgery.  He denies recent falls, increased shortness of breath, chest pain, nausea vomiting and diarrhea at this visit.  Of note once again counseled patient on importance of quitting smoking however he is not ready at this time.  Discussed below plan with patient he is agreeable.    Review of Systems  A comprehensive 12 point review of systems was completed.  Please see above for pertinent positives and negatives.     Objective:   Last 24 Hour Vital Signs:  Vitals  BP: 111/71  Temp: 98.4 °F (36.9 °C)  Temp src: Oral  Pulse: 96  Resp: 18  Height: 5' 6.9" (169.9 cm)  Weight: 82.7 kg (182 lb 5.1 oz)    Physical Examination:  General: Awake, alert, & oriented to person, place & time. No acute distress  Psychiatric: Mood and " affect normal  HEENT: Normocephalic, atraumatic. Face symmetric.  Cardiovascular: Regular rate & rhythm. Normal S1 & S2 w/out murmurs, rubs or gallops.  No JVD appreciated.  2+ pulses throughout.  Pulmonary: Bilateral symmetric chest rise. Non-labored, no wheezes, rhonchi or crackles are appreciated.  Abdominal:  Soft, nontender, nondistended. Bowel sounds present  Extremities: No clubbing, cyanosis or edema.  Skin:  Exposed skin is warm & dry.  Neuro:   Patient moves all extremities equally. Sensation intact bilateraly.    Labs  BMP:   Lab Results   Component Value Date    CHLORIDE 96 (L) 08/03/2021    CO2 35 (H) 08/03/2021    BUN 6.7 (L) 08/03/2021    CREATININE 0.71 (L) 08/03/2021    GLUCOSE 107 (H) 08/03/2021    CALCIUM 9.8 08/03/2021     CBC:   Lab Results   Component Value Date    WBC 9.1 08/03/2021    HGB 16.9 10/30/2021    HCT 56.3 (H) 10/30/2021    MCV 77.0 (L) 08/03/2021    RDW 20.5 (H) 08/03/2021     LFTs:   Lab Results   Component Value Date    LABPROT 7.3 08/03/2021    ALBUMIN 2.8 (L) 10/31/2021    AST 12 10/31/2021    ALT 29 11/12/2021    ALKPHOS 94 10/31/2021     FLP:   Lab Results   Component Value Date    CHOL 95 08/11/2021    HDL 23 (L) 08/11/2021    LDL 57.00 08/11/2021    TRIG 76 08/11/2021    TOTALCHOLEST 4 08/11/2021     DM:   Lab Results   Component Value Date    HGBA1C 5.9 09/28/2021    .6 09/28/2021    CREATININE 0.71 (L) 08/03/2021    CREATRANDUR 181.3 (H) 09/28/2021     Thyroid:   Lab Results   Component Value Date    TSH 1.200 10/01/2019     Anemia: No results found for: IRON, TIBC, FERRITIN, [id number], FOLATE      Assessment & Plan:     CAD  -Left heart catheterization 8/11/2021: 70% proximal LAD and proximal RCA lesions  -Lexiscan Stress Test 6/23/21: Medium sized area of mild inferior ischemia  -Continue aspirin, metoprolol succinate, amlodipine , and atorvastatin  -Patient was supposed to have CABG performed in [place] on 11/15/2021, had car  dificulties    Hypertension  -Blood pressure today: 111/71  -Amlodipine recently decreased by cardiology due to hypotension on visit, patient states she does not keep blood pressure at home  -Continue Metoprolol Succinate 25mg daily/lisinopril, amlodipine 5 mg  -Counseled on low salt diet  -Strict hypotension precautions provided    Diabetes mellitus  -A1C- 5.9, 9/28/2021  -Continue Metformin    HLD  -LDL at goal-57, 8/11/2021  -Continue Atorvastatin 40mg daily  -Counseled on low cholesterol, low fat diet and activity as tolerated    Tobacco Use  -Currently smoking half a pack per day, reports that he is not ready to quit at this time  -Counseled on importance of smoking cessation.  -Patient has increased hematocrit seems to be his new baseline, discontinued we will need to work him up for polycythemia vera    COPD Gold stage II  -Reports home O2 3LNC.  Instructed patient to avoid concurrent use of oxygen and tobacco use.  Verbalized understanding of risks.  -Continue Advair, albuterol  -Once again educated patient importance of quitting smoking and the benefit that it has on COPD  -Last PFTs (10/18/2021): Showing Gold stage II  -Patient continues to have pulmonology referral pending    Schizophrenia  -On Paliperidone, reports good compliance  -Sees Merged with [facility] Maintenance  Colon cancer screening:  Awaiting appointment for colonoscopy, fit test positive at last visit  Lung Cancer screen: 4/8/21 - lung rads 1 - negative, repeat CT without contrast ordered at this visit secondary to current contrast ordered shortage.  Prostate:  Will address at next visit  Hep C:  Negative  HIV:  Ordered at this visit  Dexa: n/a  Vaccines      Pneumonia: utd      Annual Flu: utd      Zoster: utd      Tdap: utd      To be addressed at next follow-up  -will need repeat lipid panel, A1c at next visit  -follow-up results of HIV and syphilis  -lung screen ct      Follow-ups  -Follow-up medicine clinic in 3  months      Juanjose Gaviria  [facility] Internal Medicine - PGY1

## 2022-05-12 NOTE — PROGRESS NOTES
Faculty addendum:     I have reviewed the patients history, residents  findings on physical examination, diagnosis and treatment plan. Care provided was reasonable and necessary.

## 2022-05-13 LAB
T PALLIDUM AB SER QL: NONREACTIVE
T PALLIDUM AB SER QL: NORMAL

## 2022-05-26 ENCOUNTER — TELEPHONE (OUTPATIENT)
Dept: ENDOSCOPY | Facility: HOSPITAL | Age: 62
End: 2022-05-26

## 2022-07-20 ENCOUNTER — HOSPITAL ENCOUNTER (EMERGENCY)
Facility: HOSPITAL | Age: 62
Discharge: HOME | End: 2022-07-20
Attending: STUDENT IN AN ORGANIZED HEALTH CARE EDUCATION/TRAINING PROGRAM
Payer: MEDICAID

## 2022-07-20 VITALS
HEIGHT: 67 IN | BODY MASS INDEX: 27.51 KG/M2 | SYSTOLIC BLOOD PRESSURE: 151 MMHG | HEART RATE: 56 BPM | DIASTOLIC BLOOD PRESSURE: 86 MMHG | OXYGEN SATURATION: 95 % | WEIGHT: 175.25 LBS | TEMPERATURE: 98 F | RESPIRATION RATE: 20 BRPM

## 2022-07-20 DIAGNOSIS — R42 DIZZINESS: ICD-10-CM

## 2022-07-20 DIAGNOSIS — I95.9 HYPOTENSION, UNSPECIFIED HYPOTENSION TYPE: Primary | ICD-10-CM

## 2022-07-20 LAB
ALBUMIN SERPL-MCNC: 3.6 GM/DL (ref 3.5–5)
ALBUMIN/GLOB SERPL: 1.1 RATIO (ref 1.1–2)
ALP SERPL-CCNC: 117 UNIT/L (ref 40–150)
ALT SERPL-CCNC: 11 UNIT/L (ref 0–55)
AST SERPL-CCNC: 13 UNIT/L (ref 5–34)
BASOPHILS # BLD AUTO: 0.03 X10(3)/MCL (ref 0–0.2)
BASOPHILS NFR BLD AUTO: 0.4 %
BILIRUBIN DIRECT+TOT PNL SERPL-MCNC: 0.5 MG/DL
BNP BLD-MCNC: <10 PG/ML
BUN SERPL-MCNC: 18 MG/DL (ref 8.4–25.7)
CALCIUM SERPL-MCNC: 9.4 MG/DL (ref 8.4–10.2)
CHLORIDE SERPL-SCNC: 96 MMOL/L (ref 98–107)
CK SERPL-CCNC: 32 U/L (ref 30–200)
CO2 SERPL-SCNC: 30 MMOL/L (ref 22–29)
CREAT SERPL-MCNC: 0.8 MG/DL (ref 0.73–1.18)
EOSINOPHIL # BLD AUTO: 0.15 X10(3)/MCL (ref 0–0.9)
EOSINOPHIL NFR BLD AUTO: 2 %
ERYTHROCYTE [DISTWIDTH] IN BLOOD BY AUTOMATED COUNT: 15 % (ref 11.5–17)
FLUAV AG UPPER RESP QL IA.RAPID: NOT DETECTED
FLUBV AG UPPER RESP QL IA.RAPID: NOT DETECTED
GLOBULIN SER-MCNC: 3.2 GM/DL (ref 2.4–3.5)
GLUCOSE SERPL-MCNC: 116 MG/DL (ref 74–100)
HCT VFR BLD AUTO: 50.8 % (ref 42–52)
HGB BLD-MCNC: 16.1 GM/DL (ref 14–18)
IMM GRANULOCYTES # BLD AUTO: 0.04 X10(3)/MCL (ref 0–0.04)
IMM GRANULOCYTES NFR BLD AUTO: 0.5 %
LYMPHOCYTES # BLD AUTO: 2.09 X10(3)/MCL (ref 0.6–4.6)
LYMPHOCYTES NFR BLD AUTO: 27.4 %
MCH RBC QN AUTO: 27.8 PG (ref 27–31)
MCHC RBC AUTO-ENTMCNC: 31.7 MG/DL (ref 33–36)
MCV RBC AUTO: 87.7 FL (ref 80–94)
MONOCYTES # BLD AUTO: 0.65 X10(3)/MCL (ref 0.1–1.3)
MONOCYTES NFR BLD AUTO: 8.5 %
NEUTROPHILS # BLD AUTO: 4.7 X10(3)/MCL (ref 2.1–9.2)
NEUTROPHILS NFR BLD AUTO: 61.2 %
NRBC BLD AUTO-RTO: 0 %
PLATELET # BLD AUTO: 216 X10(3)/MCL (ref 130–400)
PMV BLD AUTO: 9.3 FL (ref 7.4–10.4)
POTASSIUM SERPL-SCNC: 3.9 MMOL/L (ref 3.5–5.1)
PROT SERPL-MCNC: 6.8 GM/DL (ref 6.4–8.3)
RBC # BLD AUTO: 5.79 X10(6)/MCL (ref 4.7–6.1)
SARS-COV-2 RNA RESP QL NAA+PROBE: NOT DETECTED
SODIUM SERPL-SCNC: 136 MMOL/L (ref 136–145)
TROPONIN I SERPL-MCNC: <0.01 NG/ML (ref 0–0.04)
TSH SERPL-ACNC: 3.29 UIU/ML (ref 0.35–4.94)
WBC # SPEC AUTO: 7.6 X10(3)/MCL (ref 4.5–11.5)

## 2022-07-20 PROCEDURE — 82550 ASSAY OF CK (CPK): CPT | Performed by: STUDENT IN AN ORGANIZED HEALTH CARE EDUCATION/TRAINING PROGRAM

## 2022-07-20 PROCEDURE — 93005 ELECTROCARDIOGRAM TRACING: CPT

## 2022-07-20 PROCEDURE — 85025 COMPLETE CBC W/AUTO DIFF WBC: CPT | Performed by: STUDENT IN AN ORGANIZED HEALTH CARE EDUCATION/TRAINING PROGRAM

## 2022-07-20 PROCEDURE — 25000003 PHARM REV CODE 250: Performed by: STUDENT IN AN ORGANIZED HEALTH CARE EDUCATION/TRAINING PROGRAM

## 2022-07-20 PROCEDURE — 96360 HYDRATION IV INFUSION INIT: CPT

## 2022-07-20 PROCEDURE — 83880 ASSAY OF NATRIURETIC PEPTIDE: CPT | Performed by: STUDENT IN AN ORGANIZED HEALTH CARE EDUCATION/TRAINING PROGRAM

## 2022-07-20 PROCEDURE — 36415 COLL VENOUS BLD VENIPUNCTURE: CPT | Performed by: STUDENT IN AN ORGANIZED HEALTH CARE EDUCATION/TRAINING PROGRAM

## 2022-07-20 PROCEDURE — 80053 COMPREHEN METABOLIC PANEL: CPT | Performed by: STUDENT IN AN ORGANIZED HEALTH CARE EDUCATION/TRAINING PROGRAM

## 2022-07-20 PROCEDURE — 99285 EMERGENCY DEPT VISIT HI MDM: CPT | Mod: 25

## 2022-07-20 PROCEDURE — 87636 SARSCOV2 & INF A&B AMP PRB: CPT | Performed by: STUDENT IN AN ORGANIZED HEALTH CARE EDUCATION/TRAINING PROGRAM

## 2022-07-20 PROCEDURE — 84443 ASSAY THYROID STIM HORMONE: CPT | Performed by: STUDENT IN AN ORGANIZED HEALTH CARE EDUCATION/TRAINING PROGRAM

## 2022-07-20 PROCEDURE — 84484 ASSAY OF TROPONIN QUANT: CPT | Performed by: STUDENT IN AN ORGANIZED HEALTH CARE EDUCATION/TRAINING PROGRAM

## 2022-07-20 PROCEDURE — 96361 HYDRATE IV INFUSION ADD-ON: CPT

## 2022-07-20 RX ADMIN — SODIUM CHLORIDE 1000 ML: 9 INJECTION, SOLUTION INTRAVENOUS at 11:07

## 2022-07-20 RX ADMIN — SODIUM CHLORIDE 1000 ML: 9 INJECTION, SOLUTION INTRAVENOUS at 12:07

## 2022-07-21 NOTE — ED PROVIDER NOTES
Encounter Date: 7/20/2022       History     Chief Complaint   Patient presents with    Hypotension     Went to [facility] for his routine 3 month visit, sent here for hypotension. Bp 97/62 currently. Reports feeling light headed currently. Denies pain.      61-year-old male presents to ED for weakness and hypotension.  Patient was performing a follow-up medical exam when he was identified at outpatient facility as having low blood pressure.  Reports for last day or so feeling dizzy, worsened with sitting up, changing positions and ambulation.  Denies vertigo or syncope.  No chest pain or pressure at any point.  Endorses mild generalized fatigue and weakness.  Denies any trauma, reports regular p.o. intake.  Normal stool and urination.  Denies any abdominal pain, no associated diaphoresis nausea vomiting. No obvious sick exposures.  No recent drug or alcohol abuse.  Has a large stable left-sided hernia with surgery scheduled for next month.  States that is unchanged, nonpainful. Has no other complaints or concerns at this time.          Review of patient's allergies indicates:  No Known Allergies  Past Medical History:   Diagnosis Date    Bipolar disorder, unspecified     CAD (coronary artery disease)     COPD (chronic obstructive pulmonary disease)     DM (diabetes mellitus)     HLD (hyperlipidemia)     HTN (hypertension)     Obesity, unspecified     Schizophrenia, unspecified     Tobacco use      Past Surgical History:   Procedure Laterality Date    ADRENALECTOMY      ANGIOGRAM, CORONARY, WITH LEFT HEART CATHETERIZATION      FLUOROSCOPY OF LEFT HEART       No family history on file.  Social History     Tobacco Use    Smoking status: Current Some Day Smoker     Packs/day: 1.00     Years: 15.00     Pack years: 15.00     Types: Cigars    Smokeless tobacco: Never Used   Substance Use Topics    Alcohol use: Not Currently    Drug use: Yes     Frequency: 7.0 times per week     Types: Marijuana      Comment: Patient states he smokes Marijuana once daily     Review of Systems   Constitutional: Negative for chills and fever.   HENT: Negative for congestion, rhinorrhea and sore throat.    Eyes: Negative for pain, discharge and itching.   Respiratory: Negative for chest tightness and shortness of breath.    Cardiovascular: Negative for chest pain and palpitations.   Gastrointestinal: Negative for abdominal pain, constipation, diarrhea, nausea, rectal pain and vomiting.        Large known stable hernia   Genitourinary: Negative for dysuria and hematuria.   Musculoskeletal: Negative for myalgias and neck pain.   Skin: Negative for color change and rash.   Neurological: Positive for weakness and light-headedness. Negative for dizziness, tremors, seizures, syncope, facial asymmetry, speech difficulty, numbness and headaches.   Psychiatric/Behavioral: Negative for confusion. The patient is not hyperactive.        Physical Exam     Initial Vitals [07/20/22 1048]   BP Pulse Resp Temp SpO2   97/62 79 16 97.9 °F (36.6 °C) (!) 92 %      MAP       --         Physical Exam    Constitutional: He appears well-developed and well-nourished. He is not diaphoretic. No distress.   HENT:   Head: Normocephalic and atraumatic.   Eyes: Conjunctivae and EOM are normal. Pupils are equal, round, and reactive to light.   Neck: Neck supple. No tracheal deviation present.   Normal range of motion.  Cardiovascular: Normal rate, regular rhythm and normal heart sounds.   Pulmonary/Chest: Breath sounds normal. No respiratory distress.   Abdominal: Abdomen is soft. Bowel sounds are normal. There is no abdominal tenderness.   Large left-sided hernia. Nontender. Overlying skin normal.   No right CVA tenderness.  No left CVA tenderness. There is no rebound, no guarding, no tenderness at McBurney's point and negative Tomlinson's sign. negative Rovsing's sign  Musculoskeletal:         General: No tenderness. Normal range of motion.      Cervical back:  Normal range of motion and neck supple.     Neurological: He is alert and oriented to person, place, and time. He has normal strength. He displays no atrophy and no tremor. No cranial nerve deficit or sensory deficit. He exhibits normal muscle tone. He displays a negative Romberg sign. He displays no seizure activity. Coordination normal. GCS score is 15. GCS eye subscore is 4. GCS verbal subscore is 5. GCS motor subscore is 6.   Skin: Skin is warm and dry. Capillary refill takes less than 2 seconds. No rash noted.   Psychiatric: He has a normal mood and affect. His behavior is normal. Judgment and thought content normal.         ED Course   Procedures  Labs Reviewed   COMPREHENSIVE METABOLIC PANEL - Abnormal; Notable for the following components:       Result Value    Chloride 96 (*)     Carbon Dioxide 30 (*)     Glucose Level 116 (*)     All other components within normal limits   CBC WITH DIFFERENTIAL - Abnormal; Notable for the following components:    MCHC 31.7 (*)     All other components within normal limits   TROPONIN I - Normal   COVID/FLU A&B PCR - Normal   TSH - Normal   CK - Normal   B-TYPE NATRIURETIC PEPTIDE - Normal   CBC W/ AUTO DIFFERENTIAL    Narrative:     The following orders were created for panel order CBC auto differential.  Procedure                               Abnormality         Status                     ---------                               -----------         ------                     CBC with Differential[[id number]]        Abnormal            Final result                 Please view results for these tests on the individual orders.   EXTRA TUBES    Narrative:     The following orders were created for panel order EXTRA TUBES.  Procedure                               Abnormality         Status                     ---------                               -----------         ------                     Light Blue Top Hold[[id number]]                              In process                  Gold Top Hold[[id number]]                                    In process                 Pink Top Hold[[id number]]                                    In process                   Please view results for these tests on the individual orders.   LIGHT BLUE TOP HOLD   GOLD TOP HOLD   PINK TOP HOLD     EKG Readings: (Independently Interpreted)   Initial Reading: No STEMI. Rhythm: Normal Sinus Rhythm. Ectopy: No Ectopy. Conduction: Normal. Axis: Normal. Clinical Impression: Normal Sinus Rhythm     ECG Results          EKG 12-lead (Final result)  Result time 07/20/22 17:12:31    Final result by Interface, Lab In [facility] (07/20/22 17:12:31)                 Narrative:    Test Reason : R07.9,    Vent. Rate : 067 BPM     Atrial Rate : 067 BPM     P-R Int : 124 ms          QRS Dur : 086 ms      QT Int : 386 ms       P-R-T Axes : 047 084 069 degrees     QTc Int : 407 ms    Normal sinus rhythm  Normal ECG  No previous ECGs available  Confirmed by Tameka Vegas MD ([id number]) on 7/20/2022 5:12:20 PM    Referred By:             Confirmed By:Tameka Vegas MD                            Imaging Results          X-Ray Chest AP Portable (Final result)  Result time 07/20/22 12:24:29    Final result by Stefanie Zamudio MD (07/20/22 12:24:29)                 Impression:      Chronic pulmonary emphysema and interstitial scarring.  No appreciable acute superimposed airspace opacity.      Electronically signed by: Stefanie Zamudio  Date:    07/20/2022  Time:    12:24             Narrative:    EXAMINATION:  XR CHEST AP PORTABLE    CLINICAL HISTORY:  Chest Pain;    TECHNIQUE:  Single frontal view of the chest was performed.    COMPARISON:  10/30/2021, 06/21/2020    FINDINGS:  LINES AND TUBES: EKG/telemetry leads overlie the chest.    MEDIASTINUM AND PATRICK: The cardiac silhouette is normal. Aortic atherosclerosis.    LUNGS: There is pulmonary emphysema with interstitial scarring.  Nipple shadow projects over the anterior right 4th  rib.    PLEURA:No pleural effusion. No pneumothorax.    BONES: No acute osseous abnormality.  There are prominent costochondral calcifications at the 1st rib bilaterally simulating nodular densities the lung apices.  This was reviewed on prior exams and is unchanged.                                 Medications   sodium chloride 0.9% bolus 1,000 mL (0 mLs Intravenous Stopped 7/20/22 1223)   sodium chloride 0.9% bolus 1,000 mL (0 mLs Intravenous Stopped 7/20/22 1303)     Medical Decision Making:   Clinical Tests:   Lab Tests: Reviewed and Ordered  Radiological Study: Reviewed and Ordered  Medical Tests: Reviewed and Ordered  ED Management:  61-year-old male presents to ED for weakness, lightheadedness and low blood pressure.  On arrival vitals grossly stable.  He is sitting up comfortably in bed, talkative, pleasant.  His exam demonstrated a known large abdominal hernia which he states is unchanged, with normal skin and nontender already scheduled for surgery.  Has no acute findings on comprehensive neurological assessment.  EKG no acute, laboratory analysis negative for significant electrolyte dysfunctions or evidence of infection. provided multiple L fluid boluses which improved his pressures and remaining symptoms.  Patient observed for period of time.  Ultimately stable for discharge with close outpatient follow-up and strict return precautions.  Voiced understanding. ([email or web address])                      Clinical Impression:   Final diagnoses:  [I95.9] Hypotension, unspecified hypotension type (Primary)  [R42] Dizziness          ED Disposition Condition    Discharge Stable        ED Prescriptions     None        Follow-up Information     Follow up With Specialties Details Why Contact Info    [facility] - Emergency Dept Emergency Medicine  As needed, If symptoms worsen [address] [place] [address] [phone]    PCP  Schedule an appointment as soon as possible for a visit in 3 days              Clark Andrade MD  07/27/22 2038

## 2022-07-22 RX ORDER — OMEPRAZOLE 40 MG/1
40 CAPSULE, DELAYED RELEASE ORAL DAILY
COMMUNITY
Start: 2022-07-08 | End: 2022-10-05 | Stop reason: SDUPTHER

## 2022-07-22 RX ORDER — PALIPERIDONE 9 MG/1
9 TABLET, EXTENDED RELEASE ORAL DAILY
COMMUNITY
Start: 2022-07-12

## 2022-07-27 ENCOUNTER — OFFICE VISIT (OUTPATIENT)
Dept: INTERNAL MEDICINE | Facility: CLINIC | Age: 62
End: 2022-07-27
Payer: MEDICAID

## 2022-07-27 VITALS
RESPIRATION RATE: 18 BRPM | HEART RATE: 91 BPM | TEMPERATURE: 98 F | DIASTOLIC BLOOD PRESSURE: 54 MMHG | HEIGHT: 67 IN | WEIGHT: 171.19 LBS | BODY MASS INDEX: 26.87 KG/M2 | SYSTOLIC BLOOD PRESSURE: 89 MMHG

## 2022-07-27 DIAGNOSIS — Z00.00 HEALTHCARE MAINTENANCE: ICD-10-CM

## 2022-07-27 DIAGNOSIS — Z72.0 TOBACCO USER: ICD-10-CM

## 2022-07-27 DIAGNOSIS — E86.1 HYPOTENSION DUE TO HYPOVOLEMIA: ICD-10-CM

## 2022-07-27 DIAGNOSIS — F17.213 CIGARETTE NICOTINE DEPENDENCE WITH WITHDRAWAL: ICD-10-CM

## 2022-07-27 DIAGNOSIS — F17.200 NEEDS SMOKING CESSATION EDUCATION: ICD-10-CM

## 2022-07-27 DIAGNOSIS — R19.5 POSITIVE FIT (FECAL IMMUNOCHEMICAL TEST): ICD-10-CM

## 2022-07-27 DIAGNOSIS — E78.5 HYPERLIPIDEMIA LDL GOAL <70: Primary | ICD-10-CM

## 2022-07-27 PROBLEM — I95.89 HYPOTENSION DUE TO HYPOVOLEMIA: Status: ACTIVE | Noted: 2022-07-27

## 2022-07-27 PROCEDURE — 99215 OFFICE O/P EST HI 40 MIN: CPT | Mod: PBBFAC

## 2022-07-27 NOTE — PROGRESS NOTES
[facility] Internal Medicine Clinic Visit    Chief Complaint:      Follow-up (Concern with low blood pressure, and dizziness at times when moving)     Subjective:     HPI:  Benjie Patterson is a 61 y.o. male White Male smoker with a PMH of schizophrenia, diabetes mellitus, hypertension, HLD, adrenal adenoma status post resection in 2020,CAD (s/p stent to LAD 4/2022), COPD Gold stage II (last PFTs 10/18/2021)who presents to clinic today for follow-up of chronic medical conditions.  Of note patient is schizophrenic however he is compliant with his medication and is followed by [facility].      Patient presented to the ED on 07/20/2022 after attending scheduled appointment at [facility] or patient's blood pressure was reportedly 89/61, of note this is not unusual for patient.  Provider at [facility] told the patient he needed to proceed to the ED.  Upon arrival to the ED patient's blood pressure was 97/62.  Only lab abnormality at that time was a mild hypochloremic metabolic alkalosis, likely contraction alkalosis 2/2 lisinopril.  Patient received 2 L bolus normal saline with resulting blood pressure 151/86.    Today patient is also found to be hypotensive once again likely 2/2 medication use lisinopril and amlodipine will be discontinued at this time.  Gave patient appropriate fall precautions, informed him to stand up slowly.  Informed patient to increase fluid intake with water and Gatorade or Pedialyte.  Patient to repeat blood pressure at [facility] on Friday and call clinic, if blood pressure is back to normal at that time will consider starting lisinopril 5 mg. Patient will likely need to stay off amlodipine into next cardiology visit.    Orthostatics were performed patient's blood pressure while sitting was 103/65 with a heart rate of 73, after standing patient's blood pressure dropped to 83/61 with a heart rate of 94 this meets parameters for orthostatic hypotension.    Of note once again counseled  "patient on importance of quitting smoking however he is not ready at this time.  Discussed below plan with patient he is agreeable.      Review of Systems  A comprehensive 12 point review of systems was completed.  Please see above for pertinent positives and negatives.     Objective:   Last 24 Hour Vital Signs:  Vitals  BP: (!) 89/54  Temp: 97.5 °F (36.4 °C)  Temp src: Oral  Pulse: 91  Resp: 18  Height: 5' 7" (170.2 cm)  Weight: 77.7 kg (171 lb 3.2 oz)    Physical Examination:  General: Awake, alert, & oriented to person, place & time. No acute distress  Psychiatric: Mood and affect normal  HEENT: Normocephalic, atraumatic. Face symmetric.  Cardiovascular: Regular rate & rhythm. Normal S1 & S2 w/out murmurs, rubs or gallops.  No JVD appreciated.  2+ pulses throughout.  Pulmonary: Bilateral symmetric chest rise. Non-labored, no wheezes, rhonchi or crackles are appreciated.  Abdominal:  Soft, nontender, nondistended. Bowel sounds present  Extremities: No clubbing, cyanosis or edema.  Skin:  Exposed skin is warm & dry.  Neuro:   Patient moves all extremities equally. Sensation intact bilateraly.      Assessment & Plan:     CAD  -Left heart catheterization 8/11/2021: 70% proximal LAD and proximal RCA lesions  -Lexiscan Stress Test 6/23/21: Medium sized area of mild inferior ischemia  -Continue aspirin, metoprolol succinate, amlodipine , and atorvastatin  -stent placed to the LAD 4/2022  -continue Plavix 75 mg  -follows with Cardiology at [facility]    Hypertension  -Blood pressure today:  103/65  -Amlodipine decreased by cardiology due to hypotension on visit, patient states he does not keep blood pressure at home  -Continue Metoprolol Succinate 25mg daily  -held lisinopril 10mg, amlodipine 5 mg  -Counseled on low salt diet  -Strict hypotension precautions provided    Diabetes mellitus  -A1C- 5.9, 9/28/2021  -POC at next visit  -Continue Metformin    HLD  -LDL at goal-60, 05/12/22  -Continue Atorvastatin 40mg " daily  -Counseled on low cholesterol, low fat diet and activity as tolerated    Tobacco Use  -Currently smoking half a pack per day, reports that he is not ready to quit at this time  -Counseled on importance of smoking cessation.  -Patient has increased hematocrit seems to be his new baseline, discontinued we will need to work him up for polycythemia vera    COPD Gold stage II  -Reports home O2 3LNC.  Instructed patient to avoid concurrent use of oxygen and tobacco use.  Verbalized understanding of risks.  -Continue Advair, albuterol  -Once again educated patient importance of quitting smoking and the benefit that it has on COPD  -Last PFTs (10/18/2021): Showing Gold stage II  -Patient continues to have pulmonology referral pending    Schizophrenia  -On Paliperidone, reports good compliance  -Sees [facility] Maintenance  Colon cancer screening:  Awaiting appointment for colonoscopy, fit test positive at last visit, we will reorder referral at this visit (07/27/2022)  Lung Cancer screen: 4/8/21 - lung rads 1 - negative, repeat CT without contrast ordered on 5/11/22 secondary to current contrast ordered shortage, however not performed.  Will cancel this study an order low-dose lung screening at this visit.  Prostate:  Will address at next visit  Hep C:  Negative  HIV/Syphilis:  Both nonreactive on 05/11/2022  Dexa: n/a  Vaccines      Pneumonia: utd      Annual Flu: utd      Zoster: utd      Tdap: utd      To be addressed at next follow-up/Need to f/u on  -A1C  -colonoscopy      Follow-ups  -Follow-up medicine clinic in 4 months      Juanjose Gaviria  [facility] Internal Medicine - PGY1

## 2022-08-15 PROBLEM — Z00.00 HEALTHCARE MAINTENANCE: Status: RESOLVED | Noted: 2022-05-12 | Resolved: 2022-08-15

## 2022-09-14 ENCOUNTER — HOSPITAL ENCOUNTER (OUTPATIENT)
Dept: RADIOLOGY | Facility: HOSPITAL | Age: 62
Discharge: HOME | End: 2022-09-14
Attending: STUDENT IN AN ORGANIZED HEALTH CARE EDUCATION/TRAINING PROGRAM
Payer: MEDICAID

## 2022-09-14 DIAGNOSIS — F17.213 CIGARETTE NICOTINE DEPENDENCE WITH WITHDRAWAL: ICD-10-CM

## 2022-09-14 PROCEDURE — 71271 CT THORAX LUNG CANCER SCR C-: CPT | Mod: TC

## 2022-10-07 ENCOUNTER — TELEPHONE (OUTPATIENT)
Dept: HEPATOLOGY | Facility: HOSPITAL | Age: 62
End: 2022-10-07

## 2022-10-07 RX ORDER — OMEPRAZOLE 40 MG/1
40 CAPSULE, DELAYED RELEASE ORAL DAILY
Qty: 30 CAPSULE | Refills: 2 | Status: SHIPPED | OUTPATIENT
Start: 2022-10-07

## 2022-10-07 RX ORDER — METFORMIN HYDROCHLORIDE 500 MG/1
500 TABLET ORAL 2 TIMES DAILY WITH MEALS
Qty: 30 TABLET | Refills: 2 | Status: SHIPPED | OUTPATIENT
Start: 2022-10-07 | End: 2022-12-07 | Stop reason: SDUPTHER

## 2022-10-10 ENCOUNTER — TELEPHONE (OUTPATIENT)
Dept: ENDOSCOPY | Facility: HOSPITAL | Age: 62
End: 2022-10-10

## 2022-10-11 ENCOUNTER — TELEPHONE (OUTPATIENT)
Dept: INTERNAL MEDICINE | Facility: CLINIC | Age: 62
End: 2022-10-11

## 2022-10-11 NOTE — TELEPHONE ENCOUNTER
Sahra from [facility] called inquiring pt for Metformin. A Rx for a 30 day supply was sent in but pt take meds 2xs a day and pharmacist inquiring if the Rx was supposed to written as a 60 day supply instead. Please reach out to pharmacy and correct this matter. Thanks.

## 2022-11-17 ENCOUNTER — OFFICE VISIT (OUTPATIENT)
Dept: INTERNAL MEDICINE | Facility: CLINIC | Age: 62
End: 2022-11-17
Payer: MEDICAID

## 2022-11-17 VITALS
DIASTOLIC BLOOD PRESSURE: 72 MMHG | HEIGHT: 67 IN | BODY MASS INDEX: 28.41 KG/M2 | TEMPERATURE: 98 F | WEIGHT: 181 LBS | RESPIRATION RATE: 18 BRPM | HEART RATE: 78 BPM | SYSTOLIC BLOOD PRESSURE: 106 MMHG

## 2022-11-17 DIAGNOSIS — I25.10 CORONARY ARTERY DISEASE INVOLVING NATIVE CORONARY ARTERY OF NATIVE HEART WITHOUT ANGINA PECTORIS: ICD-10-CM

## 2022-11-17 DIAGNOSIS — Z79.4 INSULIN DEPENDENT TYPE 2 DIABETES MELLITUS: ICD-10-CM

## 2022-11-17 DIAGNOSIS — J44.9 CHRONIC OBSTRUCTIVE PULMONARY DISEASE, UNSPECIFIED COPD TYPE: ICD-10-CM

## 2022-11-17 DIAGNOSIS — Z23 NEED FOR INFLUENZA VACCINATION: Primary | ICD-10-CM

## 2022-11-17 DIAGNOSIS — Z12.11 COLON CANCER SCREENING: ICD-10-CM

## 2022-11-17 DIAGNOSIS — E78.5 HYPERLIPIDEMIA LDL GOAL <70: ICD-10-CM

## 2022-11-17 DIAGNOSIS — K45.8 OTHER SPECIFIED ABDOMINAL HERNIA WITHOUT OBSTRUCTION OR GANGRENE: ICD-10-CM

## 2022-11-17 DIAGNOSIS — I10 HTN (HYPERTENSION), BENIGN: ICD-10-CM

## 2022-11-17 DIAGNOSIS — E11.9 INSULIN DEPENDENT TYPE 2 DIABETES MELLITUS: ICD-10-CM

## 2022-11-17 LAB — HBA1C MFR BLD: 5.9 %

## 2022-11-17 PROCEDURE — 90686 IIV4 VACC NO PRSV 0.5 ML IM: CPT | Mod: PBBFAC

## 2022-11-17 PROCEDURE — 83036 HEMOGLOBIN GLYCOSYLATED A1C: CPT | Mod: PBBFAC

## 2022-11-17 PROCEDURE — 99214 OFFICE O/P EST MOD 30 MIN: CPT | Mod: PBBFAC

## 2022-11-17 RX ORDER — FLUTICASONE PROPIONATE AND SALMETEROL 250; 50 UG/1; UG/1
1 POWDER RESPIRATORY (INHALATION) DAILY
Qty: 90 EACH | Refills: 3 | Status: SHIPPED | OUTPATIENT
Start: 2022-11-17 | End: 2023-11-17

## 2022-11-17 NOTE — PROGRESS NOTES
I have reviewed and concur with the resident's history, physical, assessment, and plan.  I have discussed with him all issues related to the diagnosis, workup and treatment plan.Care provided as reasonable and necessary.    Odell Burton MD  [facility]

## 2022-11-17 NOTE — PROGRESS NOTES
"[facility] Clinic Visit    Chief Complaint:      Follow-up (Wants to discuss next step hernia repair)     Subjective:     HPI:  Benjie Patterson is a 62 y.o. male White Male smoker with a PMH of schizophrenia, diabetes mellitus, HLD, adrenal adenoma status post resection in 2020,CAD (s/p stent to LAD 4/2022), COPD Gold stage II (last PFTs 10/18/2021)who presents to clinic today for follow-up of chronic medical conditions.  Of note patient is schizophrenic however he is compliant with his medication and is followed by [facility].      No complaints today, still not has received appointment from surgery for hernia removal will re-refer today.  Today he is refusing colonoscopy would prefer Cologuard at this time despite positive fit test in the past.  Denies SOB, chest pain, N/V/D as well as recent falls.    Of note once again counseled patient on importance of quitting smoking however he is not ready at this time.  Discussed below plan with patient he is agreeable.      Review of Systems  A comprehensive 12 point review of systems was completed.  Please see above for pertinent positives and negatives.     Objective:   Last 24 Hour Vital Signs:  Vitals  BP: 106/72  Temp: 97.5 °F (36.4 °C)  Temp src: Oral  Pulse: 78  Resp: 18  Height: 5' 7" (170.2 cm)  Weight: 82.1 kg (181 lb)    Physical Examination:  General: Awake, alert, & oriented to person, place & time. No acute distress  Psychiatric: Mood and affect normal  HEENT: Normocephalic, atraumatic. Face symmetric.  Cardiovascular: Regular rate & rhythm. Normal S1 & S2 w/out murmurs, rubs or gallops.  No JVD appreciated.  2+ pulses throughout.  Pulmonary: Bilateral symmetric chest rise. Non-labored, no wheezes, rhonchi or crackles are appreciated.  Abdominal:  Soft, nontender. Bowel sounds present, predominant abdominal hernia approximately half the size of patient's abdominal area is noted on the left no notice of incarceration  Extremities: No clubbing, " cyanosis or edema.  Skin:  Exposed skin is warm & dry.  Neuro:   Patient moves all extremities equally. Sensation intact bilateraly.      Assessment & Plan:     Abdominal hernia  -referral placed to general surgery    CAD  -Left heart catheterization 8/11/2021: 70% proximal LAD and proximal RCA lesions  -Lexiscan Stress Test 6/23/21: Medium sized area of mild inferior ischemia  -Continue aspirin, metoprolol succinate, amlodipine , and atorvastatin  -stent placed to the LAD 4/2022  -continue Plavix 75 mg  -follows with Cardiology at [facility]    Hypertension  -Blood pressure today:  106/72  -Amlodipine decreased by cardiology due to hypotension on visit, patient states he does not keep blood pressure at home  -Continue Metoprolol Succinate 25mg daily  -held lisinopril 10mg, amlodipine 5 mg  -Counseled on low salt diet  -Strict hypotension precautions provided    Diabetes mellitus  -POC A1C at this visit (11/17/2022):  5.9  -POC at next visit  -Continue Metformin    HLD  -LDL at goal-60, 05/12/22  -Continue Atorvastatin 40mg daily  -Counseled on low cholesterol, low fat diet and activity as tolerated    Tobacco Use  -Currently smoking half a pack per day, reports that he is not ready to quit at this time  -Counseled on importance of smoking cessation.  -Patient has increased hematocrit seems to be his new baseline, discontinued we will need to work him up for polycythemia vera    COPD Gold stage II  -Continue Advair, albuterol  -Once again educated patient importance of quitting smoking and the benefit that it has on COPD  -Last PFTs (10/18/2021): Showing Gold stage II  -BMP  -at rest patient satting 90% increased to 93% on ambulation can use oxygen p.r.n. as needed at home however patient is currently not using it    Schizophrenia  -On Paliperidone, reports good compliance  -Sees [facility]    Health Maintenance  Colon cancer screening:  Awaiting appointment for colonoscopy, fit test positive at last visit,  referral ordered at last visit (7/27/2022), per note review GI called patient without answer, pt refussing colonoscopy at this time, will order cologaurd  Lung Cancer screen: 9/14/22 - lung rads 1 - negative, repeat 9/2023  Prostate:  Will address at next visit  Hep C:  Negative  HIV/Syphilis:  Both nonreactive on 05/11/2022  Dexa: n/a  Vaccines      Pneumonia: utd      Annual Flu: utd, (11/17/2022)      Zoster: utd      Tdap: utd      To be addressed at next follow-up/Need to f/u on  -cologaurd  -BMP      Follow-ups  -Follow-up medicine clinic in 4 months      Juanjose Gaviria MD  Internal Medicine - PGY-2

## 2022-11-29 ENCOUNTER — OFFICE VISIT (OUTPATIENT)
Dept: SURGERY | Facility: CLINIC | Age: 62
End: 2022-11-29
Payer: MEDICAID

## 2022-11-29 DIAGNOSIS — K45.8 OTHER SPECIFIED ABDOMINAL HERNIA WITHOUT OBSTRUCTION OR GANGRENE: Primary | ICD-10-CM

## 2022-11-29 PROCEDURE — 99214 OFFICE O/P EST MOD 30 MIN: CPT | Mod: PBBFAC

## 2022-11-29 NOTE — PROGRESS NOTES
[facility] General Surgery Clinic Note    HPI: 63 yo M with a PMH of DM, Bipolar, CAD (S/p stent on ASA/Plavix), and left cortisol secreting adrenal adenoma s/p L adrenalectomy on 6/10/20. He presents with a large left subcostal incisional hernia. Denies nausea, vomiting, abdominal pain, or constipation.    PMH:   Past Medical History:   Diagnosis Date    Bipolar disorder, unspecified     CAD (coronary artery disease)     COPD (chronic obstructive pulmonary disease)     DM (diabetes mellitus)     HLD (hyperlipidemia)     HTN (hypertension)     Obesity, unspecified     Schizophrenia, unspecified     Tobacco use       Meds:   Current Outpatient Medications:     aspirin (ECOTRIN) 81 MG EC tablet, Take 162 mg by mouth once daily., Disp: , Rfl:     atorvastatin (LIPITOR) 80 MG tablet, Take 1 tablet (80 mg total) by mouth once daily., Disp: 90 tablet, Rfl: 3    buPROPion (WELLBUTRIN XL) 150 MG TB24 tablet, Take 150 mg by mouth once daily., Disp: , Rfl:     clopidogreL (PLAVIX) 75 mg tablet, Take 1 tablet (75 mg total) by mouth once daily., Disp: 30 tablet, Rfl: 11    EScitalopram oxalate (LEXAPRO) 20 MG tablet, Take 20 mg by mouth once daily., Disp: , Rfl:     fluticasone-salmeterol diskus inhaler 250-50 mcg, Inhale 1 puff into the lungs once daily., Disp: 90 each, Rfl: 3    INVEGA 9 mg TR24, Take 9 mg by mouth once daily., Disp: , Rfl:     metFORMIN (GLUCOPHAGE) 500 MG tablet, Take 1 tablet (500 mg total) by mouth 2 (two) times daily with meals., Disp: 30 tablet, Rfl: 2    metoprolol succinate (TOPROL-XL) 25 MG 24 hr tablet, Take 1 tablet (25 mg total) by mouth once daily., Disp: 30 tablet, Rfl: 11    omeprazole (PRILOSEC) 40 MG capsule, Take 1 capsule (40 mg total) by mouth once daily., Disp: 30 capsule, Rfl: 2    paliperidone (INVEGA) 3 MG TR24, Take 3 mg by mouth once daily., Disp: , Rfl:     traZODone (DESYREL) 100 MG tablet, Take 200 mg by mouth once daily., Disp: , Rfl:     VITAMIN D2 1,250 mcg (50,000 unit) capsule, TAKE  1 CAPSULE BY MOUTH EVERY WEEK, Disp: 4 capsule, Rfl: 4    albuterol (PROVENTIL) 2.5 mg /3 mL (0.083 %) nebulizer solution, Inhale 2.5 mg into the lungs continuous prn., Disp: , Rfl:     amLODIPine (NORVASC) 5 MG tablet, Take 1 tablet (5 mg total) by mouth once daily. (Patient not taking: Reported on 7/27/2022), Disp: 30 tablet, Rfl: 11  Allergies: Review of patient's allergies indicates:  No Known Allergies  Social History:   Social History     Tobacco Use    Smoking status: Some Days     Types: Cigars    Smokeless tobacco: Never    Tobacco comments:     States last time was 2 weeks ago   Substance Use Topics    Alcohol use: Not Currently    Drug use: Yes     Frequency: 7.0 times per week     Types: Marijuana     Comment: Patient states he smokes Marijuana once daily     Family History: No family history on file.  Surgical History:   Past Surgical History:   Procedure Laterality Date    ADRENALECTOMY      ANGIOGRAM, CORONARY, WITH LEFT HEART CATHETERIZATION      FLUOROSCOPY OF LEFT HEART       Review of Systems:  Skin: No rashes or itching.  Head: Denies headache or recent trauma.  Eyes: Denies eye pain or double vision.  Neck: Denies swelling or hoarseness of voice.  Respiratory: Denies shortness of breath or chest pain  Cardiac: Denies palpitations or swelling in hands/feet.  Gastrointestinal: Denies nausea, denies vomiting.  Urinary: Denies dysuria or hematuria.  Vascular: Denies claudication or leg swelling.  Neuro: Denies motor deficits. Denies weakness.  Endocrine: Denies excessive sweating or cold intolerance.  Psych: Denies memory problems. Denies anxiety.    Objective:    Vitals:  There were no vitals filed for this visit.       Physical Exam:  Gen: NAD  Neuro: awake, alert, answering questions appropriately  CV: RRR  Resp: non-labored breathing, YANY  Abd: soft, ND, NT. Large incisional hernia, reducible.  Ext: moves all 4 spontaneously and purposefully  Skin: warm, well perfused    Assessment/Plan:   62  yo M with a PMH of DM, Bipolar, CAD (S/p stent on ASA/Plavix), and left cortisol secreting adrenal adenoma s/p L adrenalectomy on 6/10/20. He presents with a large left subcostal incisional hernia. Denies nausea, vomiting, abdominal pain, or constipation.    - Will require complex abdominal wall reconstruction.  - Patient has not had CT in over a year. Will repeat CT to evaluate hernia dimensions  - Refer to able fay for complex abdominal wall reconstruction    Desmond Avelar MD   [facility] General Surgery, HO3  11/29/2022 1:49 PM

## 2022-12-07 DIAGNOSIS — I25.10 CORONARY ARTERY DISEASE INVOLVING NATIVE CORONARY ARTERY OF NATIVE HEART WITHOUT ANGINA PECTORIS: Primary | ICD-10-CM

## 2022-12-14 RX ORDER — ASPIRIN 81 MG/1
81 TABLET ORAL DAILY
Qty: 30 TABLET | Refills: 5 | Status: SHIPPED | OUTPATIENT
Start: 2022-12-14 | End: 2023-06-12

## 2022-12-14 RX ORDER — METFORMIN HYDROCHLORIDE 500 MG/1
500 TABLET ORAL 2 TIMES DAILY WITH MEALS
Qty: 180 TABLET | Refills: 0 | Status: SHIPPED | OUTPATIENT
Start: 2022-12-14 | End: 2023-04-04 | Stop reason: SDUPTHER

## 2022-12-16 ENCOUNTER — HOSPITAL ENCOUNTER (OUTPATIENT)
Dept: RADIOLOGY | Facility: HOSPITAL | Age: 62
Discharge: HOME | End: 2022-12-16
Attending: STUDENT IN AN ORGANIZED HEALTH CARE EDUCATION/TRAINING PROGRAM
Payer: MEDICAID

## 2022-12-16 DIAGNOSIS — K45.8 OTHER SPECIFIED ABDOMINAL HERNIA WITHOUT OBSTRUCTION OR GANGRENE: ICD-10-CM

## 2022-12-16 LAB
CREAT SERPL-MCNC: 0.86 MG/DL (ref 0.73–1.18)
GFR SERPLBLD CREATININE-BSD FMLA CKD-EPI: >60 MLS/MIN/1.73/M2

## 2022-12-16 PROCEDURE — 82565 ASSAY OF CREATININE: CPT | Performed by: STUDENT IN AN ORGANIZED HEALTH CARE EDUCATION/TRAINING PROGRAM

## 2022-12-16 PROCEDURE — 25500020 PHARM REV CODE 255

## 2022-12-16 PROCEDURE — 74177 CT ABD & PELVIS W/CONTRAST: CPT | Mod: TC

## 2022-12-16 PROCEDURE — 36415 COLL VENOUS BLD VENIPUNCTURE: CPT | Performed by: STUDENT IN AN ORGANIZED HEALTH CARE EDUCATION/TRAINING PROGRAM

## 2022-12-16 RX ADMIN — IOHEXOL 100 ML: 350 INJECTION, SOLUTION INTRAVENOUS at 08:12

## 2022-12-17 LAB — NONINV COLON CA DNA+OCC BLD SCRN STL QL: NEGATIVE

## 2023-03-07 ENCOUNTER — OFFICE VISIT (OUTPATIENT)
Dept: CARDIOLOGY | Facility: CLINIC | Age: 63
End: 2023-03-07
Payer: MEDICAID

## 2023-03-07 VITALS
HEIGHT: 67 IN | OXYGEN SATURATION: 96 % | SYSTOLIC BLOOD PRESSURE: 134 MMHG | HEART RATE: 58 BPM | RESPIRATION RATE: 20 BRPM | WEIGHT: 185.19 LBS | TEMPERATURE: 98 F | BODY MASS INDEX: 29.07 KG/M2 | DIASTOLIC BLOOD PRESSURE: 85 MMHG

## 2023-03-07 DIAGNOSIS — E78.5 HYPERLIPIDEMIA LDL GOAL <70: ICD-10-CM

## 2023-03-07 DIAGNOSIS — I10 HTN (HYPERTENSION), BENIGN: ICD-10-CM

## 2023-03-07 DIAGNOSIS — I25.10 CORONARY ARTERY DISEASE INVOLVING NATIVE CORONARY ARTERY OF NATIVE HEART WITHOUT ANGINA PECTORIS: ICD-10-CM

## 2023-03-07 DIAGNOSIS — Z72.0 TOBACCO ABUSE: Primary | ICD-10-CM

## 2023-03-07 DIAGNOSIS — Z72.0 TOBACCO USE: ICD-10-CM

## 2023-03-07 PROCEDURE — 99215 OFFICE O/P EST HI 40 MIN: CPT | Mod: PBBFAC | Performed by: INTERNAL MEDICINE

## 2023-03-07 RX ORDER — ATORVASTATIN CALCIUM 40 MG/1
1 TABLET, FILM COATED ORAL NIGHTLY
COMMUNITY
End: 2023-04-04 | Stop reason: SDUPTHER

## 2023-03-07 RX ORDER — METOPROLOL SUCCINATE 25 MG/1
1 TABLET, EXTENDED RELEASE ORAL EVERY MORNING
COMMUNITY
End: 2023-04-04 | Stop reason: SDUPTHER

## 2023-03-07 RX ORDER — ASPIRIN 81 MG/1
1 TABLET ORAL EVERY MORNING
COMMUNITY
End: 2023-07-31 | Stop reason: SDUPTHER

## 2023-03-07 NOTE — PROGRESS NOTES
[facility] Cardiology  Established Patient Visit      CHIEF COMPLAINT:   Chief Complaint   Patient presents with    f/u visit, cardiac clearence fo hernia repair, laith carrera                   Review of patient's allergies indicates:  No Known Allergies                                       HPI:  This is a 61-year-old  male with a past medical history of CAD/PCI,  diabetes mellitus, hypertension, HLD, adrenal adenoma status post resection in 2020, and tobacco use who presents for routine follow up and ongoing care.  The patient completed an Echocardiogram that revealed LVEF approximately 55 to 60% (see report below). He had an abnormal Lexiscan stress test on 6.23.21 which revealed a medium-sized area of mild inferior ischemia. Left heart catheterization performed on 8.11.21 showed two-vessel disease, with 70% proximal LAD and proximal RCA lesions.  He underwent subsequent LAD stent performed at [facility] in [facility] 2021. At this current time the patient denies any cardiac complaints, chest pain, palpitations, orthopnea, peripheral edema, claudication symptoms, PND, dizziness syncope.  He can complete >4 METS of activity without symptoms. He is adherent to medications including DAPT.    Echocardiogram April 22, 2021:  Ventricular ejection fraction is measured at approximately 55 to 60%.  Structurally normal mitral valve.  Individual aortic valve leaflets are not clearly visualized  Normal flow velocities across aortic valve.  Trace tricuspid regurgitation with an RVSP of 27 mmHg.  No evidence of pulmonic regurgitation.  Normal size left atrium.  Borderline dilated right atrium.  Mildly enlarged right ventricle.  No evidence of a pericardial effusion.  No evidence of pleural effusion.  The IVC is normal.                                                                                                                                                                                                                                                                                                                                                                                                                                                               Patient Active Problem List   Diagnosis    Coronary artery disease involving native coronary artery of native heart without angina pectoris    HTN (hypertension), benign    Hyperlipidemia LDL goal <70    Tobacco use    Chronic obstructive pulmonary disease    Hypotension due to hypovolemia     Past Surgical History:   Procedure Laterality Date    ADRENALECTOMY      ANGIOGRAM, CORONARY, WITH LEFT HEART CATHETERIZATION      FLUOROSCOPY OF LEFT HEART       Social History     Socioeconomic History    Marital status: Single   Tobacco Use    Smoking status: Former     Types: Cigars    Smokeless tobacco: Never    Tobacco comments:     States last time was 2 weeks ago   Substance and Sexual Activity    Alcohol use: Not Currently    Drug use: Yes     Frequency: 7.0 times per week     Types: Marijuana     Comment: Patient states he smokes Marijuana once daily    Sexual activity: Not Currently     Social Determinants of Health     Financial Resource Strain: Medium Risk    Difficulty of Paying Living Expenses: Somewhat hard   Food Insecurity: Food Insecurity Present    Worried About Running Out of Food in the Last Year: Sometimes true    Ran Out of Food in the Last Year: Never true   Transportation Needs: No Transportation Needs    Lack of Transportation (Medical): No    Lack of Transportation (Non-Medical): No   Physical Activity: Inactive    Days of Exercise per Week: 0 days    Minutes of Exercise per Session: 0 min   Stress: No Stress Concern Present    Feeling of Stress : Not at all   Social Connections: Moderately Isolated    Frequency of Communication with Friends and Family: Twice a week    Frequency of Social Gatherings with Friends and Family: Once a week    Attends Jehov[employer] witness Services: More  than 4 times per year    Active Member of Clubs or Organizations: No    Attends Club or Organization Meetings: Never    Marital Status:    Housing Stability: Low Risk     Unable to Pay for Housing in the Last Year: No    Number of Places Lived in the Last Year: 1    Unstable Housing in the Last Year: No        History reviewed. No pertinent family history.      Current Outpatient Medications:     albuterol (PROVENTIL) 2.5 mg /3 mL (0.083 %) nebulizer solution, Inhale 2.5 mg into the lungs continuous prn., Disp: , Rfl:     amLODIPine (NORVASC) 5 MG tablet, Take 1 tablet (5 mg total) by mouth once daily., Disp: 30 tablet, Rfl: 11    aspirin (ECOTRIN) 81 MG EC tablet, Take 1 tablet (81 mg total) by mouth once daily., Disp: 30 tablet, Rfl: 5    atorvastatin (LIPITOR) 80 MG tablet, Take 1 tablet (80 mg total) by mouth once daily., Disp: 90 tablet, Rfl: 3    buPROPion (WELLBUTRIN XL) 150 MG TB24 tablet, Take 150 mg by mouth once daily., Disp: , Rfl:     clopidogreL (PLAVIX) 75 mg tablet, Take 1 tablet (75 mg total) by mouth once daily., Disp: 30 tablet, Rfl: 11    EScitalopram oxalate (LEXAPRO) 20 MG tablet, Take 20 mg by mouth once daily., Disp: , Rfl:     fluticasone-salmeterol diskus inhaler 250-50 mcg, Inhale 1 puff into the lungs once daily., Disp: 90 each, Rfl: 3    INVEGA 9 mg TR24, Take 9 mg by mouth once daily., Disp: , Rfl:     metFORMIN (GLUCOPHAGE) 500 MG tablet, Take 1 tablet (500 mg total) by mouth 2 (two) times daily with meals., Disp: 180 tablet, Rfl: 0    metoprolol succinate (TOPROL-XL) 25 MG 24 hr tablet, Take 1 tablet (25 mg total) by mouth once daily., Disp: 30 tablet, Rfl: 11    omeprazole (PRILOSEC) 40 MG capsule, Take 1 capsule (40 mg total) by mouth once daily., Disp: 30 capsule, Rfl: 2    paliperidone (INVEGA) 3 MG TR24, Take 3 mg by mouth once daily., Disp: , Rfl:     traZODone (DESYREL) 100 MG tablet, Take 200 mg by mouth once daily., Disp: , Rfl:     VITAMIN D2 1,250 mcg (50,000 unit)  "capsule, TAKE 1 CAPSULE BY MOUTH EVERY WEEK, Disp: 4 capsule, Rfl: 4    aspirin (ECOTRIN) 81 MG EC tablet, Take 1 tablet by mouth every morning., Disp: , Rfl:     atorvastatin (LIPITOR) 40 MG tablet, Take 1 tablet by mouth every evening., Disp: , Rfl:     metoprolol succinate (TOPROL-XL) 25 MG 24 hr tablet, Take 1 tablet by mouth every morning., Disp: , Rfl:      ROS:                                                                                                                                                                             Review of Systems   Constitutional: Negative.    HENT: Negative.     Eyes: Negative.    Respiratory:  Positive for shortness of breath.    Cardiovascular: Negative.    Gastrointestinal: Negative.    Genitourinary: Negative.    Musculoskeletal: Negative.    Skin: Negative.    Neurological: Negative.    Endo/Heme/Allergies: Negative.    Psychiatric/Behavioral: Negative.        Blood pressure 134/85, pulse (!) 58, temperature 98 °F (36.7 °C), temperature source Oral, resp. rate 20, height 5' 7" (1.702 m), weight 84 kg (185 lb 3 oz), SpO2 96 %.   PE:  Physical Exam  Constitutional:       Appearance: Normal appearance.   HENT:      Head: Normocephalic.   Cardiovascular:      Rate and Rhythm: Regular rhythm. Bradycardia present.   Abdominal:      Hernia: A hernia is present.   Musculoskeletal:         General: Normal range of motion.   Neurological:      General: No focal deficit present.      Mental Status: He is alert.   Psychiatric:         Mood and Affect: Mood normal.         Behavior: Behavior normal.      ASSESSMENT/PLAN:  CAD  - S/p KYLIE to LAD at [facility]  ([facility]- Dec. 2021)  - LVEF-55-60% per ECHO 4.22.21  - Denies CP. Reports stable SOB  - stop clopidogrel, continue asa  - Continue aspirin, metoprolol succinate, amlodipine , and atorvastatin  - Counseled on heart healtly, low cholesterol diet and activity as directed     Hypertension- at goal  - Continue current medication " regimen  - Counseled on low sodium diet     Diabetes mellitus  - A1C- 5.9  - Management per PCP    HLD  - LDL at goal-60  - Continue Atorvastatin 40mg daily  - Counseled again on low cholesterol, low fat diet and activity as tolerated    Tobacco Use  - Currently smoking pack of cigarettes per day and states is not ready to quit at this time  - Counseled on importance of smoking cessation. Offered smoking cessation program    COPD  - Reports stable SOB  - Continue management per PCP    Perioperative Cardiovascular Risk Assessment and Management for Noncardiac Procedure/Surgery  - Planned/Proposed Procedure/Surgery: Incisional Hernia Repair  - Timing of Procedure/Surgery: Elective  - Risk of Procedure/Surgery: Intermediate   - METS: >/=4   Active Cardiac Conditions: No (ADHF, ACS, Severe Valvular Disease, Significant Arrhythmia)   - RCRI Score: 1 (history of ischemic heart disease)  - The patient is a (Low) risk for a (Intermediate) risk procedure      Follow up in Cardiology clinic in 6 months      Aristeo Gill MD  [facility] Cardiology Fellow

## 2023-03-16 NOTE — PROGRESS NOTES
Cardiology attending addendum  Patient's case discussed with cardiology fellow Dr. Aristeo Gill. Agree with plan as outlined above.     Tameka Vegas MD  Cardiology-[facility]

## 2023-04-04 ENCOUNTER — OFFICE VISIT (OUTPATIENT)
Dept: INTERNAL MEDICINE | Facility: CLINIC | Age: 63
End: 2023-04-04
Payer: MEDICAID

## 2023-04-04 VITALS
HEIGHT: 67 IN | RESPIRATION RATE: 18 BRPM | DIASTOLIC BLOOD PRESSURE: 82 MMHG | HEART RATE: 92 BPM | OXYGEN SATURATION: 95 % | WEIGHT: 187.81 LBS | TEMPERATURE: 98 F | BODY MASS INDEX: 29.48 KG/M2 | SYSTOLIC BLOOD PRESSURE: 121 MMHG

## 2023-04-04 DIAGNOSIS — Z79.4 INSULIN DEPENDENT TYPE 2 DIABETES MELLITUS: Primary | ICD-10-CM

## 2023-04-04 DIAGNOSIS — E11.9 INSULIN DEPENDENT TYPE 2 DIABETES MELLITUS: Primary | ICD-10-CM

## 2023-04-04 LAB — HBA1C MFR BLD: 5.7 %

## 2023-04-04 PROCEDURE — 83036 HEMOGLOBIN GLYCOSYLATED A1C: CPT | Mod: PBBFAC

## 2023-04-04 PROCEDURE — 99214 OFFICE O/P EST MOD 30 MIN: CPT | Mod: PBBFAC

## 2023-04-04 RX ORDER — TRAZODONE HYDROCHLORIDE 100 MG/1
1 TABLET ORAL NIGHTLY
COMMUNITY

## 2023-04-04 RX ORDER — BUPROPION HYDROCHLORIDE 150 MG/1
1 TABLET ORAL NIGHTLY
COMMUNITY

## 2023-04-04 RX ORDER — METOPROLOL SUCCINATE 25 MG/1
25 TABLET, EXTENDED RELEASE ORAL EVERY MORNING
Qty: 30 TABLET | Refills: 11 | Status: SHIPPED | OUTPATIENT
Start: 2023-04-04 | End: 2023-09-07 | Stop reason: SDUPTHER

## 2023-04-04 RX ORDER — CLOPIDOGREL BISULFATE 75 MG/1
75 TABLET ORAL
COMMUNITY
Start: 2023-03-27 | End: 2023-09-07 | Stop reason: SDUPTHER

## 2023-04-04 RX ORDER — ATORVASTATIN CALCIUM 80 MG/1
1 TABLET, FILM COATED ORAL NIGHTLY
COMMUNITY
End: 2023-04-04

## 2023-04-04 RX ORDER — ATORVASTATIN CALCIUM 40 MG/1
40 TABLET, FILM COATED ORAL NIGHTLY
Qty: 90 TABLET | Refills: 3 | Status: SHIPPED | OUTPATIENT
Start: 2023-04-04 | End: 2023-09-07 | Stop reason: SDUPTHER

## 2023-04-04 RX ORDER — ERGOCALCIFEROL 1.25 MG/1
50000 CAPSULE ORAL
COMMUNITY

## 2023-04-04 RX ORDER — PALIPERIDONE 9 MG/1
1 TABLET, EXTENDED RELEASE ORAL EVERY MORNING
COMMUNITY

## 2023-04-04 RX ORDER — METFORMIN HYDROCHLORIDE 500 MG/1
500 TABLET ORAL 2 TIMES DAILY WITH MEALS
Qty: 180 TABLET | Refills: 3 | Status: SHIPPED | OUTPATIENT
Start: 2023-04-04 | End: 2024-04-03

## 2023-04-04 RX ORDER — ESCITALOPRAM OXALATE 20 MG/1
1 TABLET ORAL EVERY MORNING
COMMUNITY

## 2023-04-04 RX ORDER — PALIPERIDONE 3 MG/1
1 TABLET, EXTENDED RELEASE ORAL EVERY MORNING
COMMUNITY

## 2023-04-04 NOTE — PROGRESS NOTES
"[facility] Visit    Chief Complaint:      Follow-up (4 month follow up, patient states no other concerns at this time.) and Medication Refill (Atorvastatin, metformin, metoprolol)     Subjective:     HPI:  Benjie Patterson is a 62 y.o. male White Male smoker with a PMH of schizophrenia, diabetes mellitus, HLD, adrenal adenoma status post resection in 2020,CAD (s/p stent to LAD 4/2022), COPD Gold stage II (last PFTs 10/18/2021)who presents to clinic today for follow-up of chronic medical conditions.  Of note patient is schizophrenic however he is compliant with his medication and is followed by [facility].      No complaints today, since his last appointment with me patient had his abdominal hernia repaired as well as extensive abdominal wall reconstruction performed wound looks good at this time sutures have just been removed he has upcoming appointment with surgery in a couple of weeks for follow-up.  He is requesting medication refill for his Lipitor, metoprolol and metformin.  He denies any recent falls, chest pain, abdominal pain as well as N/V/D.      Review of Systems  A comprehensive 12 point review of systems was completed.  Please see above for pertinent positives and negatives.     Objective:   Last 24 Hour Vital Signs:  Vitals  BP: 121/82  Temp: 98.3 °F (36.8 °C)  Pulse: 92  Resp: 18  SpO2: 95 %  Height: 5' 7" (170.2 cm)  Weight: 85.2 kg (187 lb 12.8 oz)    Physical Examination:  General: Awake, alert, & oriented to person, place & time. No acute distress  Psychiatric: Mood and affect normal  HEENT: Normocephalic, atraumatic. Face symmetric.  Cardiovascular: Regular rate & rhythm. Normal S1 & S2 w/out murmurs, rubs or gallops.  No JVD appreciated.  2+ pulses throughout.  Pulmonary: Bilateral symmetric chest rise. Non-labored, no wheezes, rhonchi or crackles are appreciated.  Abdominal:  Soft, nontender. Bowel sounds present, well healing surgical scar is noted across central abdomen to " left flank sutures have been removed  Extremities: No clubbing, cyanosis or edema.  Skin:  Exposed skin is warm & dry.  Neuro:   Patient moves all extremities equally. Sensation intact bilateraly.      Assessment & Plan:     Abdominal hernia s/p removal  -surgery performed in [place] with abdominal wall reconstruction  -currently doing well as follow-up with surgery please keep follow-up with surgery    CAD  -Left heart catheterization 8/11/2021: 70% proximal LAD and proximal RCA lesions  -Lexiscan Stress Test 6/23/21: Medium sized area of mild inferior ischemia  -Continue aspirin, metoprolol succinate, amlodipine , and atorvastatin  -stent placed to the LAD 4/2022  -continue Plavix 75 mg  -follows with Cardiology at [facility]    Hypertension  -Blood pressure today:  121/82  -Amlodipine decreased by cardiology due to hypotension on visit, patient states he does not keep blood pressure at home  -Continue Metoprolol Succinate 25mg daily  -held lisinopril 10mg, amlodipine 5 mg  -Counseled on low salt diet  -Strict hypotension precautions provided    Diabetes mellitus  -POC A1C  at this visit (4/4/2023) :5.7  -Continue Metformin 500 mg b.i.d.    HLD  -LDL at goal-60, 05/12/22  -Continue Atorvastatin 40mg daily  -Counseled on low cholesterol, low fat diet and activity as tolerated    Tobacco Use  -Currently smoking half a pack per day, reports that he is not ready to quit at this time  -Counseled on importance of smoking cessation.  -Patient has increased hematocrit seems to be his new baseline, discontinued we will need to work him up for polycythemia vera    COPD Gold stage II  -Continue Advair, albuterol  -Once again educated patient importance of quitting smoking and the benefit that it has on COPD  -Last PFTs (10/18/2021): Showing Gold stage II  -BMP  -at rest patient satting 90% increased to 93% on ambulation can use oxygen p.r.n. as needed at home however patient is currently not using it    Schizophrenia  -On  Paliperidone, reports good compliance  -Sees [facility]  Colon cancer screenin22 negative Cologuard  Lung Cancer screen: 22 - lung rads 1 - negative, repeat 2023  Hep C:  Negative  HIV/Syphilis:  Both nonreactive on 2022  Dexa: n/a  Vaccines      Pneumonia: utd      Annual Flu: utd, (2022)      Zoster: utd      Tdap: utd      To be addressed at next follow-up/Need to f/u on  -order lipid panel at next visit      Follow-ups  -Follow-up medicine clinic in 3 months      Juanjose Gaviria MD  Internal Medicine - PGY-2

## 2023-05-13 NOTE — PATIENT INSTRUCTIONS
Follow up in Cardiology clinic in 4 months with FLP/CMP or sooner if needed   Follow up with PCP/pulmonology clinic as directed   All other review of systems negative, except as noted in HPI

## 2023-07-28 DIAGNOSIS — F25.0 SCHIZOAFFECTIVE DISORDER, BIPOLAR TYPE: ICD-10-CM

## 2023-07-28 RX ORDER — ERGOCALCIFEROL 1.25 MG/1
CAPSULE ORAL
Qty: 4 CAPSULE | Refills: 4 | Status: SHIPPED | OUTPATIENT
Start: 2023-07-28 | End: 2024-01-08 | Stop reason: SDUPTHER

## 2023-07-31 DIAGNOSIS — I25.10 ATHEROSCLEROSIS OF NATIVE CORONARY ARTERY OF NATIVE HEART WITHOUT ANGINA PECTORIS: Primary | ICD-10-CM

## 2023-08-01 RX ORDER — ASPIRIN 81 MG/1
81 TABLET ORAL EVERY MORNING
Qty: 28 TABLET | Refills: 0 | Status: SHIPPED | OUTPATIENT
Start: 2023-08-01 | End: 2024-03-12 | Stop reason: SDUPTHER

## 2023-09-07 ENCOUNTER — OFFICE VISIT (OUTPATIENT)
Dept: CARDIOLOGY | Facility: CLINIC | Age: 63
End: 2023-09-07
Payer: MEDICAID

## 2023-09-07 VITALS
OXYGEN SATURATION: 93 % | WEIGHT: 189.63 LBS | DIASTOLIC BLOOD PRESSURE: 84 MMHG | SYSTOLIC BLOOD PRESSURE: 132 MMHG | HEART RATE: 85 BPM | TEMPERATURE: 98 F | HEIGHT: 67 IN | RESPIRATION RATE: 20 BRPM | BODY MASS INDEX: 29.76 KG/M2

## 2023-09-07 DIAGNOSIS — Z72.0 TOBACCO USE: ICD-10-CM

## 2023-09-07 DIAGNOSIS — I25.10 CORONARY ARTERY DISEASE INVOLVING NATIVE CORONARY ARTERY OF NATIVE HEART WITHOUT ANGINA PECTORIS: ICD-10-CM

## 2023-09-07 DIAGNOSIS — E78.5 HYPERLIPIDEMIA LDL GOAL <70: ICD-10-CM

## 2023-09-07 DIAGNOSIS — I10 HTN (HYPERTENSION), BENIGN: ICD-10-CM

## 2023-09-07 DIAGNOSIS — I25.10 CORONARY ARTERY DISEASE, UNSPECIFIED VESSEL OR LESION TYPE, UNSPECIFIED WHETHER ANGINA PRESENT, UNSPECIFIED WHETHER NATIVE OR TRANSPLANTED HEART: Primary | ICD-10-CM

## 2023-09-07 PROCEDURE — 93005 ELECTROCARDIOGRAM TRACING: CPT

## 2023-09-07 PROCEDURE — 99215 OFFICE O/P EST HI 40 MIN: CPT | Mod: PBBFAC | Performed by: INTERNAL MEDICINE

## 2023-09-07 RX ORDER — ATORVASTATIN CALCIUM 40 MG/1
40 TABLET, FILM COATED ORAL NIGHTLY
Qty: 90 TABLET | Refills: 3 | Status: SHIPPED | OUTPATIENT
Start: 2023-09-07 | End: 2024-09-06

## 2023-09-07 RX ORDER — METOPROLOL SUCCINATE 25 MG/1
25 TABLET, EXTENDED RELEASE ORAL EVERY MORNING
Qty: 30 TABLET | Refills: 11 | Status: SHIPPED | OUTPATIENT
Start: 2023-09-07 | End: 2024-09-06

## 2023-09-07 RX ORDER — CLOPIDOGREL BISULFATE 75 MG/1
75 TABLET ORAL DAILY
Qty: 30 TABLET | Refills: 11 | Status: SHIPPED | OUTPATIENT
Start: 2023-09-07 | End: 2024-03-12

## 2023-09-07 NOTE — PROGRESS NOTES
[facility]   Cardiology Clinic - Follow Up     Date of Visit: 9/7/2023  Reason for Visit/Chief Complaint:   Chief Complaint    f/u denies chest pain or sob since LV no questions           History of Present Illness:      Benjie Patterson is a 62 y.o. male with a PMH significant for  CAD/PCI,  diabetes mellitus, hypertension, HLD, adrenal adenoma status post resection in 2020, and history of tobacco use  who presents to cardiology clinic for follow up. He presents today without complaints. States he is feeling great and he quit smoking. BP is controlled off of medication. SOB is at his baseline.     Historical Information:    The patient completed an Echocardiogram that revealed LVEF approximately 55 to 60% (see report below). He had an abnormal Lexiscan stress test on 6.23.21 which revealed a medium-sized area of mild inferior ischemia. Left heart catheterization performed on 8.11.21 showed two-vessel disease, with 70% proximal LAD and proximal RCA lesions.  He underwent subsequent LAD stent performed at [facility] in [facility] 2021.     CP:  The patient has no chest discomfort.      SOB:  The patient has shortness of breath. Has SOLANO    EDEMA:  The patient denies edema.      ORTHOPNEA:  The patient denies orthopnea.  No PND.      SYNCOPE:  The patient denies near syncope.  No syncope.   No dizziness.    PALPITATIONS:  The patient has no palpitations.    Past Medical History:        Past Medical History:   Diagnosis Date    Bipolar disorder, unspecified     CAD (coronary artery disease)     COPD (chronic obstructive pulmonary disease)     DM (diabetes mellitus)     HLD (hyperlipidemia)     HTN (hypertension)     Obesity, unspecified     Schizophrenia, unspecified     Tobacco use        Surgical History:        Past Surgical History:   Procedure Laterality Date    ADRENALECTOMY      ANGIOGRAM, CORONARY, WITH LEFT HEART CATHETERIZATION      FLUOROSCOPY OF LEFT HEART         Family History:       History reviewed. No pertinent family history.    Social History:        Social History     Tobacco Use    Smoking status: Former     Types: Cigars    Smokeless tobacco: Never    Tobacco comments:     States last time was 2 weeks ago   Substance Use Topics    Alcohol use: Not Currently    Drug use: Yes     Frequency: 7.0 times per week     Types: Marijuana     Comment: Patient states he smokes Marijuana once daily       Allergies:       Review of patient's allergies indicates:  No Known Allergies    Medications:        Current Outpatient Medications   Medication Sig Dispense Refill    albuterol (PROVENTIL) 2.5 mg /3 mL (0.083 %) nebulizer solution Inhale 2.5 mg into the lungs continuous prn.      aspirin (ECOTRIN) 81 MG EC tablet Take 1 tablet (81 mg total) by mouth every morning. 28 tablet 0    atorvastatin (LIPITOR) 40 MG tablet Take 1 tablet (40 mg total) by mouth every evening. 90 tablet 3    buPROPion (WELLBUTRIN XL) 150 MG TB24 tablet Take 150 mg by mouth once daily.      clopidogreL (PLAVIX) 75 mg tablet Take 75 mg by mouth.      EScitalopram oxalate (LEXAPRO) 20 MG tablet Take 20 mg by mouth once daily.      fluticasone-salmeterol diskus inhaler 250-50 mcg Inhale 1 puff into the lungs once daily. 90 each 3    metFORMIN (GLUCOPHAGE) 500 MG tablet Take 1 tablet (500 mg total) by mouth 2 (two) times daily with meals. 180 tablet 3    metoprolol succinate (TOPROL-XL) 25 MG 24 hr tablet Take 1 tablet (25 mg total) by mouth every morning. 30 tablet 11    buPROPion (WELLBUTRIN XL) 150 MG TB24 tablet Take 1 tablet by mouth every evening.      ergocalciferol (ERGOCALCIFEROL) 50,000 unit Cap Take 50,000 Units by mouth.      EScitalopram oxalate (LEXAPRO) 20 MG tablet Take 1 tablet by mouth every morning.      INVEGA 9 mg TR24 Take 9 mg by mouth once daily.      omeprazole (PRILOSEC) 40 MG capsule Take 1 capsule (40 mg total) by mouth once daily. 30 capsule 2    paliperidone (INVEGA) 3 MG TR24 Take 3 mg by mouth once  daily.      paliperidone (INVEGA) 3 MG TR24 Take 1 tablet by mouth every morning.      paliperidone (INVEGA) 9 MG TR24 Take 1 tablet by mouth every morning.      traZODone (DESYREL) 100 MG tablet Take 200 mg by mouth once daily.      traZODone (DESYREL) 100 MG tablet Take 1 tablet by mouth every evening.      VITAMIN D2 1,250 mcg (50,000 unit) capsule TAKE 1 CAPSULE BY MOUTH ONCE WEEKLY 4 capsule 4     No current facility-administered medications for this visit.       I have reviewed and updated the patient's medications, allergies, past medical history, surgical history, social history and family history as needed.    Review of Systems:      Review of Systems   Constitutional:  Negative for chills, diaphoresis and fever.   HENT:  Negative for hearing loss and nosebleeds.    Eyes:  Negative for blurred vision.   Respiratory:  Positive for shortness of breath.    Cardiovascular:  Negative for chest pain, palpitations, orthopnea, claudication and PND.   Gastrointestinal:  Negative for nausea and vomiting.   Genitourinary:  Negative for dysuria.   Musculoskeletal:  Negative for myalgias.   Skin:  Negative for rash.   Neurological:  Negative for dizziness and headaches.       Objective:        Vitals:    09/07/23 0929   BP: 132/84   Pulse:    Resp:    Temp:      Wt Readings from Last 3 Encounters:   09/07/23 86 kg (189 lb 9.5 oz)   04/04/23 85.2 kg (187 lb 12.8 oz)   03/07/23 84 kg (185 lb 3 oz)     Temp Readings from Last 3 Encounters:   09/07/23 97.9 °F (36.6 °C) (Oral)   04/04/23 98.3 °F (36.8 °C)   03/07/23 98 °F (36.7 °C) (Oral)     BP Readings from Last 3 Encounters:   09/07/23 132/84   04/04/23 121/82   03/07/23 134/85     Pulse Readings from Last 3 Encounters:   09/07/23 85   04/04/23 92   03/07/23 (!) 58       Vitals:    09/07/23 0911 09/07/23 0929   BP: (!) 142/89 132/84   BP Location: Left arm Left arm   Patient Position: Sitting    BP Method: Medium (Automatic) Medium (Manual)   Pulse: 85    Resp: 20   "  Temp: 97.9 °F (36.6 °C)    TempSrc: Oral    SpO2: (!) 93%    Weight: 86 kg (189 lb 9.5 oz)    Height: 5' 7" (1.702 m)      Body mass index is 29.69 kg/m².    Physical Exam  Constitutional:       Appearance: Normal appearance.   HENT:      Head: Normocephalic and atraumatic.   Eyes:      Conjunctiva/sclera: Conjunctivae normal.   Neck:      Vascular: No carotid bruit.   Cardiovascular:      Rate and Rhythm: Normal rate and regular rhythm.      Pulses: Normal pulses.      Heart sounds: Normal heart sounds.   Pulmonary:      Effort: No respiratory distress.      Breath sounds: Normal breath sounds.   Abdominal:      General: Bowel sounds are normal.      Palpations: Abdomen is soft.   Musculoskeletal:      Right lower leg: No edema.      Left lower leg: No edema.   Skin:     General: Skin is warm and dry.   Neurological:      Mental Status: He is alert. Mental status is at baseline.   Psychiatric:         Mood and Affect: Mood normal.         Thought Content: Thought content normal.         Judgment: Judgment normal.          Labs:      I have reviewed the following labs below:      CBC:  Lab Results   Component Value Date    WBC 7.6 07/20/2022    HGB 16.1 07/20/2022    HCT 50.8 07/20/2022     07/20/2022    MCV 87.7 07/20/2022    RDW 15.0 07/20/2022     BMP:  Lab Results   Component Value Date     02/01/2023    K 4.8 02/01/2023     02/01/2023    CO2 31 02/01/2023    BUN 19 02/01/2023    CALCIUM 9.8 02/01/2023    MG 1.50 (L) 08/03/2021    PHOS 3.5 08/03/2021     LFTs:  Lab Results   Component Value Date    ALBUMIN 3.6 07/20/2022    BILITOT 0.5 07/20/2022    AST 13 07/20/2022    ALKPHOS 117 07/20/2022    ALT 11 07/20/2022     FLP:  Cholesterol Total   Date Value Ref Range Status   05/12/2022 148 <=200 mg/dL Final     HDL Cholesterol   Date Value Ref Range Status   05/12/2022 31 (L) 35 - 60 mg/dL Final     LDL Cholesterol   Date Value Ref Range Status   05/12/2022 60.00 50.00 - 140.00 mg/dL Final "     Triglyceride   Date Value Ref Range Status   05/12/2022 287 (H) 34 - 140 mg/dL Final     DM:  Lab Results   Component Value Date    HGBA1C 5.9 09/28/2021    HGBA1C 6.8 01/26/2021    HGBA1C 6.9 (H) 09/25/2020    CREATININE 0.80 02/01/2023     Thyroid:  Lab Results   Component Value Date    TSH 3.2907 07/20/2022     Coags:  Lab Results   Component Value Date    INR 1.00 11/12/2021    PROTIME 13.0 11/12/2021      Cardiac:  Lab Results   Component Value Date    TROPONINI <0.010 07/20/2022    TROPONINI <0.010 10/30/2021    BNP <10.0 07/20/2022    BNP 13.6 10/30/2021       Cardiac Studies/Imaging:        I have reviewed the following studies below:      Echocardiogram April 22, 2021:  Ventricular ejection fraction is measured at approximately 55 to 60%.  Structurally normal mitral valve.  Individual aortic valve leaflets are not clearly visualized  Normal flow velocities across aortic valve.  Trace tricuspid regurgitation with an RVSP of 27 mmHg.  No evidence of pulmonic regurgitation.  Normal size left atrium.  Borderline dilated right atrium.  Mildly enlarged right ventricle.  No evidence of a pericardial effusion.  No evidence of pleural effusion.  The IVC is normal.      Assessment & Plan:      62 y.o. male with the following medical problems:    CAD  - S/p KYLIE to LAD at [facility]  ([facility]- Dec. 2021)  - LVEF-55-60% per ECHO 4.22.21  - Denies CP. Reports stable SOB  - Continue DAPT as tolerated   - Continue aspirin, metoprolol succinate, and atorvastatin  - Counseled on heart healtly, low cholesterol diet and activity as directed     Hypertension- at goal  - Currently controlled with lifestyle modifications  - Counseled on low sodium diet     Diabetes mellitus  - A1C- 5.9  - Management per PCP    HLD  - LDL at goal-60  - Continue Atorvastatin 40mg daily  - Counseled again on low cholesterol, low fat diet and activity as tolerated    History of Tobacco Use  - patient reports he quit smoking and is feeling great  -  counseled on continued cessation     COPD  - Reports stable SOB  - Continue management per PCP    Return to clinic in 6 months.      No future appointments.      Alexus Sinha DO  [facility] Cardiology Fellow, PGY-6  09/07/2023 10:07 AM

## 2024-01-08 DIAGNOSIS — F25.0 SCHIZOAFFECTIVE DISORDER, BIPOLAR TYPE: ICD-10-CM

## 2024-01-08 RX ORDER — ERGOCALCIFEROL 1.25 MG/1
50000 CAPSULE ORAL
Qty: 4 CAPSULE | Refills: 4 | Status: SHIPPED | OUTPATIENT
Start: 2024-01-08

## 2024-03-12 ENCOUNTER — OFFICE VISIT (OUTPATIENT)
Dept: CARDIOLOGY | Facility: CLINIC | Age: 64
End: 2024-03-12
Payer: MEDICAID

## 2024-03-12 VITALS
TEMPERATURE: 99 F | BODY MASS INDEX: 31.52 KG/M2 | HEIGHT: 67 IN | SYSTOLIC BLOOD PRESSURE: 160 MMHG | DIASTOLIC BLOOD PRESSURE: 90 MMHG | WEIGHT: 200.81 LBS | HEART RATE: 84 BPM | RESPIRATION RATE: 18 BRPM

## 2024-03-12 DIAGNOSIS — I25.10 CORONARY ARTERY DISEASE INVOLVING NATIVE CORONARY ARTERY OF NATIVE HEART WITHOUT ANGINA PECTORIS: Primary | ICD-10-CM

## 2024-03-12 DIAGNOSIS — I10 HTN (HYPERTENSION), BENIGN: ICD-10-CM

## 2024-03-12 DIAGNOSIS — E78.5 HYPERLIPIDEMIA LDL GOAL <70: ICD-10-CM

## 2024-03-12 DIAGNOSIS — I25.10 ATHEROSCLEROSIS OF NATIVE CORONARY ARTERY OF NATIVE HEART WITHOUT ANGINA PECTORIS: ICD-10-CM

## 2024-03-12 DIAGNOSIS — E11.9 TYPE 2 DIABETES MELLITUS WITHOUT COMPLICATION, WITHOUT LONG-TERM CURRENT USE OF INSULIN: ICD-10-CM

## 2024-03-12 DIAGNOSIS — J44.9 CHRONIC OBSTRUCTIVE PULMONARY DISEASE, UNSPECIFIED COPD TYPE: ICD-10-CM

## 2024-03-12 LAB
CHOLEST SERPL-MCNC: 178 MG/DL
CHOLEST/HDLC SERPL: 4 {RATIO} (ref 0–5)
EST. AVERAGE GLUCOSE BLD GHB EST-MCNC: 122.6 MG/DL
HBA1C MFR BLD: 5.9 %
HDLC SERPL-MCNC: 42 MG/DL (ref 35–60)
LDLC SERPL CALC-MCNC: 98 MG/DL (ref 50–140)
TRIGL SERPL-MCNC: 189 MG/DL (ref 34–140)
VLDLC SERPL CALC-MCNC: 38 MG/DL

## 2024-03-12 PROCEDURE — 80061 LIPID PANEL: CPT | Performed by: INTERNAL MEDICINE

## 2024-03-12 PROCEDURE — 83036 HEMOGLOBIN GLYCOSYLATED A1C: CPT | Performed by: INTERNAL MEDICINE

## 2024-03-12 PROCEDURE — 99214 OFFICE O/P EST MOD 30 MIN: CPT | Mod: PBBFAC | Performed by: INTERNAL MEDICINE

## 2024-03-12 PROCEDURE — 36415 COLL VENOUS BLD VENIPUNCTURE: CPT | Performed by: INTERNAL MEDICINE

## 2024-03-12 RX ORDER — ASPIRIN 81 MG/1
81 TABLET ORAL EVERY MORNING
Qty: 90 TABLET | Refills: 3 | Status: SHIPPED | OUTPATIENT
Start: 2024-03-12

## 2024-03-12 RX ORDER — LISINOPRIL 5 MG/1
5 TABLET ORAL DAILY
Qty: 90 TABLET | Refills: 3 | Status: SHIPPED | OUTPATIENT
Start: 2024-03-12 | End: 2025-03-12

## 2024-03-12 NOTE — PATIENT INSTRUCTIONS
Please following medication changes.   STOP Clopidogrel (PLAVIX) and START Aspirin 81 mg and lisinopril 5 mg.  Nurse Visit in 1 month.Please take blood pressure medication 1 hr before appointment.  Return to clinic in 6 months for a follow up appointment with provider.

## 2024-03-12 NOTE — PROGRESS NOTES
[facility] Cardiology  Established Patient Visit      CHIEF COMPLAINT:   Chief Complaint   Patient presents with    Follow-up                   Review of patient's allergies indicates:  No Known Allergies                                       HPI:  This is a 63-year-old  male with CAD s/p PCI to LAD in 2021 in [facility],   diabetes mellitus, hypertension, HLD, adrenal adenoma status post resection in 2020, and former tobacco use who presents for routine follow up and ongoing care.      He had an abnormal Lexiscan stress test on 6.23.21 which revealed a medium-sized area of mild inferior ischemia. Left heart catheterization performed on 8.11.21 showed two-vessel disease, with 70% proximal LAD and proximal RCA lesions.  He underwent subsequent LAD stent performed at [facility] in Decemeber 2021.    Today, he reports feeling well with only stable dyspnea on moderate exertion. He denies any cardiac complaints, chest pain, palpitations, orthopnea, peripheral edema, claudication symptoms, PND, dizziness syncope.  No bleeding on plavix.    Echocardiogram April 22, 2021:  Ventricular ejection fraction is measured at approximately 55 to 60%.  Structurally normal mitral valve.  Individual aortic valve leaflets are not clearly visualized  Normal flow velocities across aortic valve.  Trace tricuspid regurgitation with an RVSP of 27 mmHg.  No evidence of pulmonic regurgitation.  Normal size left atrium.  Borderline dilated right atrium.  Mildly enlarged right ventricle.  No evidence of a pericardial effusion.  No evidence of pleural effusion.  The IVC is normal.                                                                                                                                                                                                                                                                                                                                                                                                                                                               Patient Active Problem List   Diagnosis    Coronary artery disease involving native coronary artery of native heart without angina pectoris    HTN (hypertension), benign    Hyperlipidemia LDL goal <70    Tobacco use    Chronic obstructive pulmonary disease    Hypotension due to hypovolemia     Past Surgical History:   Procedure Laterality Date    ADRENALECTOMY      ANGIOGRAM, CORONARY, WITH LEFT HEART CATHETERIZATION      FLUOROSCOPY OF LEFT HEART       Social History     Socioeconomic History    Marital status: Single   Tobacco Use    Smoking status: Former     Types: Cigars    Smokeless tobacco: Never    Tobacco comments:     States last time was 2 weeks ago   Substance and Sexual Activity    Alcohol use: Not Currently    Drug use: Yes     Frequency: 7.0 times per week     Types: Marijuana     Comment: Patient states he smokes Marijuana once daily    Sexual activity: Not Currently     Social Determinants of Health     Financial Resource Strain: Medium Risk (5/12/2022)    Overall Financial Resource Strain (CARDIA)     Difficulty of Paying Living Expenses: Somewhat hard   Food Insecurity: Food Insecurity Present (5/12/2022)    Hunger Vital Sign     Worried About Running Out of Food in the Last Year: Sometimes true     Ran Out of Food in the Last Year: Never true   Transportation Needs: No Transportation Needs (5/12/2022)    PRAPARE - Transportation     Lack of Transportation (Medical): No     Lack of Transportation (Non-Medical): No   Physical Activity: Inactive (7/27/2022)    Exercise Vital Sign     Days of Exercise per Week: 0 days     Minutes of Exercise per Session: 0 min   Stress: No Stress Concern Present (7/27/2022)    [employer] - Occupational Stress Questionnaire     Feeling of Stress : Not at all   Social Connections: Moderately Isolated (5/12/2022)    Social Connection and Isolation Panel [NHANES]     Frequency of  Communication with Friends and Family: Twice a week     Frequency of Social Gatherings with Friends and Family: Once a week     Attends Muslim Services: More than 4 times per year     Active Member of Clubs or Organizations: No     Attends Club or Organization Meetings: Never     Marital Status:    Housing Stability: Low Risk  (5/12/2022)    Housing Stability Vital Sign     Unable to Pay for Housing in the Last Year: No     Number of Places Lived in the Last Year: 1     Unstable Housing in the Last Year: No        History reviewed. No pertinent family history.      Current Outpatient Medications:     albuterol (PROVENTIL) 2.5 mg /3 mL (0.083 %) nebulizer solution, Inhale 2.5 mg into the lungs continuous prn., Disp: , Rfl:     atorvastatin (LIPITOR) 40 MG tablet, Take 1 tablet (40 mg total) by mouth every evening., Disp: 90 tablet, Rfl: 3    buPROPion (WELLBUTRIN XL) 150 MG TB24 tablet, Take 150 mg by mouth once daily., Disp: , Rfl:     buPROPion (WELLBUTRIN XL) 150 MG TB24 tablet, Take 1 tablet by mouth every evening., Disp: , Rfl:     ergocalciferol (ERGOCALCIFEROL) 50,000 unit Cap, Take 50,000 Units by mouth., Disp: , Rfl:     EScitalopram oxalate (LEXAPRO) 20 MG tablet, Take 20 mg by mouth once daily., Disp: , Rfl:     EScitalopram oxalate (LEXAPRO) 20 MG tablet, Take 1 tablet by mouth every morning., Disp: , Rfl:     INVEGA 9 mg TR24, Take 9 mg by mouth once daily., Disp: , Rfl:     metFORMIN (GLUCOPHAGE) 500 MG tablet, Take 1 tablet (500 mg total) by mouth 2 (two) times daily with meals., Disp: 180 tablet, Rfl: 3    metoprolol succinate (TOPROL-XL) 25 MG 24 hr tablet, Take 1 tablet (25 mg total) by mouth every morning., Disp: 30 tablet, Rfl: 11    omeprazole (PRILOSEC) 40 MG capsule, Take 1 capsule (40 mg total) by mouth once daily., Disp: 30 capsule, Rfl: 2    paliperidone (INVEGA) 3 MG TR24, Take 3 mg by mouth once daily., Disp: , Rfl:     paliperidone (INVEGA) 3 MG TR24, Take 1 tablet by mouth  "every morning., Disp: , Rfl:     paliperidone (INVEGA) 9 MG TR24, Take 1 tablet by mouth every morning., Disp: , Rfl:     traZODone (DESYREL) 100 MG tablet, Take 200 mg by mouth once daily., Disp: , Rfl:     traZODone (DESYREL) 100 MG tablet, Take 1 tablet by mouth every evening., Disp: , Rfl:     VITAMIN D2 1,250 mcg (50,000 unit) capsule, TAKE 1 CAPSULE BY MOUTH EVERY WEEK, Disp: 4 capsule, Rfl: 4    aspirin (ECOTRIN) 81 MG EC tablet, Take 1 tablet (81 mg total) by mouth every morning., Disp: 90 tablet, Rfl: 3    fluticasone-salmeterol diskus inhaler 250-50 mcg, Inhale 1 puff into the lungs once daily., Disp: 90 each, Rfl: 3     ROS:                                                                                                                                                                             Review of Systems   Constitutional: Negative.    HENT: Negative.     Eyes: Negative.    Respiratory:  Positive for shortness of breath.    Cardiovascular: Negative.    Gastrointestinal: Negative.    Genitourinary: Negative.    Musculoskeletal: Negative.    Skin: Negative.    Neurological: Negative.    Endo/Heme/Allergies: Negative.    Psychiatric/Behavioral: Negative.          Blood pressure (!) 143/88, pulse 84, temperature 98.8 °F (37.1 °C), resp. rate 18, height 5' 7" (1.702 m), weight 91.1 kg (200 lb 12.8 oz).   PE:  General: alert and oriented/no acute distress  Eye: EOMI/normal conjunctiva/no xanthelasma  HENT: normocephalic/moist oral mucosa  Neck: supple/nontender/no carotid bruit  Respiratory: lungs CTA/nonlabored respirations/BS equal/symmetrical expansion/no  chest wall tenderness  Cardiovascular: normal rate/normal rhythm/no murmur/normal peripheral perfusion/no  edema/no JVD  Gastrointestinal: soft/nontender  Musculoskeletal: normal ROM  Integumentary: warm/dry/pink/intact  Neurologic: alert/oriented/normal sensory/no focal deficits  Psychiatric: cooperative/appropriate mood and affect/normal " judgment       ASSESSMENT/PLAN:  CAD  - S/p KYLIE to LAD at [facility]  ([facility]- Dec[facility])  - LVEF-55-60% per ECHO 4.22.21  - Denies CP. Reports stable SOB  - stop clopidogrel, restart aspirin 81mg (will need indefinitely)  - Continue aspirin, metoprolol succinate, and atorvastatin  - Counseled on heart healtly, low cholesterol diet and activity as directed     Hypertension  BP elevated at 143/88 with repeat 160/90  Will start lisinopril 5mg and plan NV      Diabetes mellitus  - A1C- 5.9 in 2021  - Management per PCP  Will obtain repeat A1c    HLD  - LDL at goal-60 in 2022  - Continue Atorvastatin 40mg daily  - Counseled again on low cholesterol, low fat diet and activity as tolerated  Will repeat lipid panel    Tobacco Use  Quit smoking >1 yr ago, feels good    COPD  - Reports stable SOB  - Continue management per PCP

## 2024-04-16 RX ORDER — METFORMIN HYDROCHLORIDE 500 MG/1
500 TABLET ORAL 2 TIMES DAILY WITH MEALS
Qty: 180 TABLET | Refills: 3 | Status: SHIPPED | OUTPATIENT
Start: 2024-04-16 | End: 2025-04-16

## 2024-04-18 ENCOUNTER — CLINICAL SUPPORT (OUTPATIENT)
Dept: CARDIOLOGY | Facility: CLINIC | Age: 64
End: 2024-04-18
Payer: MEDICAID

## 2024-04-18 VITALS
OXYGEN SATURATION: 90 % | SYSTOLIC BLOOD PRESSURE: 94 MMHG | TEMPERATURE: 98 F | HEIGHT: 67 IN | RESPIRATION RATE: 28 BRPM | WEIGHT: 195 LBS | HEART RATE: 90 BPM | BODY MASS INDEX: 30.61 KG/M2 | DIASTOLIC BLOOD PRESSURE: 66 MMHG

## 2024-04-18 DIAGNOSIS — I10 HTN (HYPERTENSION), BENIGN: Primary | ICD-10-CM

## 2024-04-18 PROCEDURE — 99214 OFFICE O/P EST MOD 30 MIN: CPT | Mod: PBBFAC

## 2024-04-18 RX ORDER — CLOPIDOGREL BISULFATE 75 MG/1
75 TABLET ORAL
COMMUNITY
Start: 2024-04-09

## 2024-04-18 NOTE — PROGRESS NOTES
Patient saw for nurse visit for bp and hr check after starting lisinopril 5 mg po daily. He denies cp and sob at this time. Pt follows a low sodium diet. BP 94/66, hr 90, o2 sat 90% on room air. Presented to Dr Vegas who advised pt to contact current medication, monitor bp at home and report adverse readings or any new s/s to clinic. Pt advised and understanding verbalized. ED precautions reviewed with pt. Referral being sent to IM for pt to establish care w/ pcp.

## 2024-05-29 DIAGNOSIS — F25.0 SCHIZOAFFECTIVE DISORDER, BIPOLAR TYPE: ICD-10-CM

## 2024-05-29 RX ORDER — ERGOCALCIFEROL 1.25 1/1
50000 CAPSULE ORAL
Qty: 4 CAPSULE | Refills: 4 | Status: SHIPPED | OUTPATIENT
Start: 2024-05-29 | End: 2024-05-30

## 2024-05-30 DIAGNOSIS — F25.0 SCHIZOAFFECTIVE DISORDER, BIPOLAR TYPE: ICD-10-CM

## 2024-05-30 RX ORDER — ERGOCALCIFEROL 1.25 1/1
50000 CAPSULE ORAL
Qty: 4 CAPSULE | Refills: 4 | Status: SHIPPED | OUTPATIENT
Start: 2024-05-30

## 2024-09-25 ENCOUNTER — PATIENT MESSAGE (OUTPATIENT)
Dept: CARDIOLOGY | Facility: HOSPITAL | Age: 64
End: 2024-09-25
Payer: MEDICAID

## 2024-09-25 ENCOUNTER — PATIENT MESSAGE (OUTPATIENT)
Dept: CARDIOLOGY | Facility: CLINIC | Age: 64
End: 2024-09-25
Payer: MEDICAID

## 2024-09-25 RX ORDER — ATORVASTATIN CALCIUM 40 MG/1
40 TABLET, FILM COATED ORAL NIGHTLY
Qty: 90 TABLET | Refills: 3 | Status: SHIPPED | OUTPATIENT
Start: 2024-09-25 | End: 2025-09-25

## 2024-09-25 RX ORDER — CLOPIDOGREL BISULFATE 75 MG/1
75 TABLET ORAL DAILY
Qty: 90 TABLET | Refills: 3 | OUTPATIENT
Start: 2024-09-25

## 2024-09-25 RX ORDER — METOPROLOL SUCCINATE 25 MG/1
25 TABLET, EXTENDED RELEASE ORAL EVERY MORNING
Qty: 90 TABLET | Refills: 3 | Status: SHIPPED | OUTPATIENT
Start: 2024-09-25 | End: 2025-09-25

## 2024-10-22 ENCOUNTER — OFFICE VISIT (OUTPATIENT)
Dept: CARDIOLOGY | Facility: CLINIC | Age: 64
End: 2024-10-22
Payer: MEDICAID

## 2024-10-22 VITALS
SYSTOLIC BLOOD PRESSURE: 142 MMHG | RESPIRATION RATE: 20 BRPM | TEMPERATURE: 99 F | WEIGHT: 175.81 LBS | DIASTOLIC BLOOD PRESSURE: 92 MMHG | HEIGHT: 67 IN | BODY MASS INDEX: 27.6 KG/M2 | OXYGEN SATURATION: 88 % | HEART RATE: 80 BPM

## 2024-10-22 DIAGNOSIS — N52.9 ERECTILE DYSFUNCTION, UNSPECIFIED ERECTILE DYSFUNCTION TYPE: Primary | ICD-10-CM

## 2024-10-22 DIAGNOSIS — E78.5 HYPERLIPIDEMIA LDL GOAL <70: ICD-10-CM

## 2024-10-22 DIAGNOSIS — Z79.4 TYPE 2 DIABETES MELLITUS WITHOUT COMPLICATION, WITH LONG-TERM CURRENT USE OF INSULIN: ICD-10-CM

## 2024-10-22 DIAGNOSIS — I25.10 CORONARY ARTERY DISEASE INVOLVING NATIVE CORONARY ARTERY OF NATIVE HEART WITHOUT ANGINA PECTORIS: ICD-10-CM

## 2024-10-22 DIAGNOSIS — E11.9 TYPE 2 DIABETES MELLITUS WITHOUT COMPLICATION, WITH LONG-TERM CURRENT USE OF INSULIN: ICD-10-CM

## 2024-10-22 DIAGNOSIS — I10 HTN (HYPERTENSION), BENIGN: Primary | ICD-10-CM

## 2024-10-22 DIAGNOSIS — Z87.891 HISTORY OF CIGAR SMOKING: ICD-10-CM

## 2024-10-22 PROBLEM — E86.1 HYPOTENSION DUE TO HYPOVOLEMIA: Status: RESOLVED | Noted: 2022-07-27 | Resolved: 2024-10-22

## 2024-10-22 PROCEDURE — 99215 OFFICE O/P EST HI 40 MIN: CPT | Mod: PBBFAC | Performed by: INTERNAL MEDICINE

## 2024-10-22 RX ORDER — SILDENAFIL 25 MG/1
25 TABLET, FILM COATED ORAL DAILY PRN
Qty: 10 TABLET | Refills: 2 | Status: SHIPPED | OUTPATIENT
Start: 2024-10-22 | End: 2025-10-22

## 2024-10-22 RX ORDER — ATORVASTATIN CALCIUM 80 MG/1
80 TABLET, FILM COATED ORAL DAILY
Qty: 90 TABLET | Refills: 3 | Status: SHIPPED | OUTPATIENT
Start: 2024-10-22 | End: 2025-10-22

## 2024-10-22 RX ORDER — SILDENAFIL 25 MG/1
25 TABLET, FILM COATED ORAL DAILY PRN
Qty: 10 TABLET | Refills: 2 | Status: CANCELLED | OUTPATIENT
Start: 2024-10-22 | End: 2025-10-22

## 2024-10-22 RX ORDER — LISINOPRIL 10 MG/1
10 TABLET ORAL DAILY
Qty: 90 TABLET | Refills: 3 | Status: SHIPPED | OUTPATIENT
Start: 2024-10-22 | End: 2025-10-22

## 2024-10-22 NOTE — PROGRESS NOTES
[facility]  Established Patient Visit      CHIEF COMPLAINT:   Chief Complaint   Patient presents with    f/u denies chest pain or sob since LV no questions      Pt did not bring meds and can not verify meds                   Review of patient's allergies indicates:  No Known Allergies                                 HPI:  This is a 63-year-old  male with CAD s/p PCI to LAD in 2021 in [facility],   diabetes mellitus, hypertension, HLD, adrenal adenoma status post resection in 2020, and former tobacco use who presents for routine follow up and ongoing care.      He had an abnormal Lexiscan stress test on 6.23.21 which revealed a medium-sized area of mild inferior ischemia. Left heart catheterization performed on 8.11.21 showed two-vessel disease, with 70% proximal LAD and proximal RCA lesions.  He underwent subsequent LAD stent performed at [facility] in Decemeber 2021.    Today, he reports feeling well and denies any cardiac complaints, chest pain, palpitations, orthopnea, peripheral edema, claudication symptoms, PND, dizziness syncope.  No bleeding on aspirin.  He is requesting viagra which had worked in the past.     Echocardiogram April 22, 2021:  Ventricular ejection fraction is measured at approximately 55 to 60%.  Structurally normal mitral valve.  Individual aortic valve leaflets are not clearly visualized  Normal flow velocities across aortic valve.  Trace tricuspid regurgitation with an RVSP of 27 mmHg.  No evidence of pulmonic regurgitation.  Normal size left atrium.  Borderline dilated right atrium.  Mildly enlarged right ventricle.  No evidence of a pericardial effusion.  No evidence of pleural effusion.  The IVC is normal.                                                                                                                                                                                                                                                                                                                                                                                                                                                               Patient Active Problem List   Diagnosis    Coronary artery disease involving native coronary artery of native heart without angina pectoris    HTN (hypertension), benign    Hyperlipidemia LDL goal <70    Tobacco use    Chronic obstructive pulmonary disease    Hypotension due to hypovolemia     Past Surgical History:   Procedure Laterality Date    ADRENALECTOMY      ANGIOGRAM, CORONARY, WITH LEFT HEART CATHETERIZATION      FLUOROSCOPY OF LEFT HEART      HERNIA REPAIR       Social History     Socioeconomic History    Marital status: Single   Tobacco Use    Smoking status: Every Day     Types: Cigars    Smokeless tobacco: Never    Tobacco comments:     3 cigars a day   Substance and Sexual Activity    Alcohol use: Not Currently    Drug use: Yes     Frequency: 7.0 times per week     Types: Marijuana     Comment: Patient states he smokes Marijuana once daily    Sexual activity: Not Currently     Birth control/protection: Abstinence     Social Drivers of Health     Financial Resource Strain: Medium Risk (5/12/2022)    Overall Financial Resource Strain (CARDIA)     Difficulty of Paying Living Expenses: Somewhat hard   Food Insecurity: Food Insecurity Present (5/12/2022)    Hunger Vital Sign     Worried About Running Out of Food in the Last Year: Sometimes true     Ran Out of Food in the Last Year: Never true   Transportation Needs: No Transportation Needs (5/12/2022)    PRAPARE - Transportation     Lack of Transportation (Medical): No     Lack of Transportation (Non-Medical): No   Physical Activity: Inactive (7/27/2022)    Exercise Vital Sign     Days of Exercise per Week: 0 days     Minutes of Exercise per Session: 0 min   Stress: No Stress Concern Present (7/27/2022)    [employer] [place] [employer] - Occupational Stress Questionnaire     Feeling of  Stress : Not at all   Housing Stability: Low Risk  (5/12/2022)    Housing Stability Vital Sign     Unable to Pay for Housing in the Last Year: No     Number of Places Lived in the Last Year: 1     Unstable Housing in the Last Year: No        Family History   Problem Relation Name Age of Onset    Arthritis Father Dillon gong     Diabetes Father Dillon gong     Hypertension Father Dillon gong     Cancer Maternal Grandmother Aurea arce     Asthma Sister Jessica justin     Depression Sister Jessica jutsin     Cancer Maternal Aunt Tatiana bro     Depression Sister Brittni rivera          Current Outpatient Medications:     albuterol (PROVENTIL) 2.5 mg /3 mL (0.083 %) nebulizer solution, Inhale 2.5 mg into the lungs continuous prn., Disp: , Rfl:     aspirin (ECOTRIN) 81 MG EC tablet, Take 1 tablet (81 mg total) by mouth every morning., Disp: 90 tablet, Rfl: 3    atorvastatin (LIPITOR) 40 MG tablet, Take 1 tablet (40 mg total) by mouth every evening., Disp: 90 tablet, Rfl: 3    buPROPion (WELLBUTRIN XL) 150 MG TB24 tablet, Take 150 mg by mouth once daily., Disp: , Rfl:     buPROPion (WELLBUTRIN XL) 150 MG TB24 tablet, Take 1 tablet by mouth every evening., Disp: , Rfl:     clopidogreL (PLAVIX) 75 mg tablet, Take 75 mg by mouth., Disp: , Rfl:     ergocalciferol (ERGOCALCIFEROL) 50,000 unit Cap, Take 50,000 Units by mouth., Disp: , Rfl:     EScitalopram oxalate (LEXAPRO) 20 MG tablet, Take 20 mg by mouth once daily., Disp: , Rfl:     EScitalopram oxalate (LEXAPRO) 20 MG tablet, Take 1 tablet by mouth every morning., Disp: , Rfl:     fluticasone-salmeterol diskus inhaler 250-50 mcg, Inhale 1 puff into the lungs once daily., Disp: 90 each, Rfl: 3    INVEGA 9 mg TR24, Take 9 mg by mouth once daily. (Patient not taking: Reported on 4/18/2024), Disp: , Rfl:     lisinopriL (PRINIVIL,ZESTRIL) 5 MG tablet, Take 1 tablet (5 mg total) by mouth once daily., Disp: 90 tablet, Rfl: 3    metFORMIN (GLUCOPHAGE) 500 MG tablet, Take 1  "tablet (500 mg total) by mouth 2 (two) times daily with meals. (Patient not taking: Reported on 4/18/2024), Disp: 180 tablet, Rfl: 3    metoprolol succinate (TOPROL-XL) 25 MG 24 hr tablet, Take 1 tablet (25 mg total) by mouth every morning., Disp: 90 tablet, Rfl: 3    omeprazole (PRILOSEC) 40 MG capsule, Take 1 capsule (40 mg total) by mouth once daily. (Patient not taking: Reported on 4/18/2024), Disp: 30 capsule, Rfl: 2    paliperidone (INVEGA) 3 MG TR24, Take 3 mg by mouth once daily. (Patient not taking: Reported on 4/18/2024), Disp: , Rfl:     paliperidone (INVEGA) 3 MG TR24, Take 1 tablet by mouth every morning., Disp: , Rfl:     paliperidone (INVEGA) 9 MG TR24, Take 1 tablet by mouth every morning., Disp: , Rfl:     traZODone (DESYREL) 100 MG tablet, Take 200 mg by mouth once daily., Disp: , Rfl:     traZODone (DESYREL) 100 MG tablet, Take 1 tablet by mouth every evening., Disp: , Rfl:     VITAMIN D2 1,250 mcg (50,000 unit) capsule, TAKE 1 CAPSULE BY MOUTH EVERY WEEK, Disp: 4 capsule, Rfl: 4     ROS:                                                                                                                                                                             Review of Systems   Constitutional: Negative.    HENT: Negative.     Eyes: Negative.    Respiratory:  Positive for shortness of breath.    Cardiovascular: Negative.    Gastrointestinal: Negative.    Genitourinary: Negative.    Musculoskeletal: Negative.    Skin: Negative.    Neurological: Negative.    Endo/Heme/Allergies: Negative.    Psychiatric/Behavioral: Negative.          Blood pressure (!) 148/90, pulse 80, temperature 98.6 °F (37 °C), temperature source Oral, resp. rate 20, height 5' 7" (1.702 m), weight 79.7 kg (175 lb 12.8 oz), SpO2 (!) 88%.   PE:  General: alert and oriented/no acute distress  Eye: EOMI/normal conjunctiva/no xanthelasma  HENT: normocephalic/moist oral mucosa  Neck: supple/nontender/no carotid bruit  Respiratory: lungs " CTA/nonlabored respirations/BS equal/symmetrical expansion/no  chest wall tenderness  Cardiovascular: normal rate/normal rhythm/no murmur/normal peripheral perfusion/no  edema/no JVD  Gastrointestinal: soft/nontender  Musculoskeletal: normal ROM  Integumentary: warm/dry/pink/intact  Neurologic: alert/oriented/normal sensory/no focal deficits  Psychiatric: cooperative/appropriate mood and affect/normal judgment       ASSESSMENT/PLAN:  CAD  - S/p KYLIE to LAD at [facility]  ([facility]- Dec. 2021)  - LVEF-55-60% per ECHO 4.22.21  - Denies CP, SOB  - Continue aspirin, metoprolol succinate, and atorvastatin  - Counseled on heart healtly, low cholesterol diet and activity as directed     Hypertension  BP elevated at 148/90 with repeat 142/92  Will increase lisinopril to 10mg and plan NV      Diabetes mellitus  - A1C- 5.9 in 2021  - Management per PCP      HLD  - LDL at goal-60 in 2022 but more recently 98 in 3/2024  Will increse Atorvastatin to 80mg daily      Tobacco Use  Quit smoking >1 yr ago but is now smoking cigars  Strongly recommended quitting     COPD  - Continue management per PCP

## 2024-10-23 ENCOUNTER — PATIENT MESSAGE (OUTPATIENT)
Dept: CARDIOLOGY | Facility: CLINIC | Age: 64
End: 2024-10-23
Payer: MEDICAID

## 2025-03-13 DIAGNOSIS — F25.0 SCHIZOAFFECTIVE DISORDER, BIPOLAR TYPE: ICD-10-CM

## 2025-03-13 RX ORDER — METFORMIN HYDROCHLORIDE 500 MG/1
500 TABLET ORAL 2 TIMES DAILY WITH MEALS
Qty: 56 TABLET | Refills: 2 | Status: SHIPPED | OUTPATIENT
Start: 2025-03-13 | End: 2026-03-13

## 2025-03-13 RX ORDER — ERGOCALCIFEROL 1.25 MG/1
50000 CAPSULE ORAL
Qty: 4 CAPSULE | Refills: 4 | Status: SHIPPED | OUTPATIENT
Start: 2025-03-13

## 2025-04-22 ENCOUNTER — OFFICE VISIT (OUTPATIENT)
Dept: CARDIOLOGY | Facility: CLINIC | Age: 65
End: 2025-04-22
Payer: MEDICAID

## 2025-04-22 VITALS
WEIGHT: 164 LBS | OXYGEN SATURATION: 95 % | BODY MASS INDEX: 26.36 KG/M2 | RESPIRATION RATE: 18 BRPM | HEART RATE: 115 BPM | SYSTOLIC BLOOD PRESSURE: 130 MMHG | DIASTOLIC BLOOD PRESSURE: 78 MMHG | TEMPERATURE: 99 F | HEIGHT: 66 IN

## 2025-04-22 DIAGNOSIS — Z87.891 HISTORY OF CIGAR SMOKING: ICD-10-CM

## 2025-04-22 DIAGNOSIS — Z79.4 TYPE 2 DIABETES MELLITUS WITHOUT COMPLICATION, WITH LONG-TERM CURRENT USE OF INSULIN: ICD-10-CM

## 2025-04-22 DIAGNOSIS — I10 HTN (HYPERTENSION), BENIGN: ICD-10-CM

## 2025-04-22 DIAGNOSIS — E78.5 HYPERLIPIDEMIA LDL GOAL <70: ICD-10-CM

## 2025-04-22 DIAGNOSIS — E11.9 TYPE 2 DIABETES MELLITUS WITHOUT COMPLICATION, WITH LONG-TERM CURRENT USE OF INSULIN: ICD-10-CM

## 2025-04-22 DIAGNOSIS — I25.10 CORONARY ARTERY DISEASE INVOLVING NATIVE CORONARY ARTERY OF NATIVE HEART WITHOUT ANGINA PECTORIS: Primary | ICD-10-CM

## 2025-04-22 PROCEDURE — 99214 OFFICE O/P EST MOD 30 MIN: CPT | Mod: S$PBB,,, | Performed by: NURSE PRACTITIONER

## 2025-04-22 PROCEDURE — 1159F MED LIST DOCD IN RCRD: CPT | Mod: CPTII,,, | Performed by: NURSE PRACTITIONER

## 2025-04-22 PROCEDURE — 93005 ELECTROCARDIOGRAM TRACING: CPT

## 2025-04-22 PROCEDURE — 3008F BODY MASS INDEX DOCD: CPT | Mod: CPTII,,, | Performed by: NURSE PRACTITIONER

## 2025-04-22 PROCEDURE — 99215 OFFICE O/P EST HI 40 MIN: CPT | Mod: PBBFAC | Performed by: NURSE PRACTITIONER

## 2025-04-22 PROCEDURE — 4010F ACE/ARB THERAPY RXD/TAKEN: CPT | Mod: CPTII,,, | Performed by: NURSE PRACTITIONER

## 2025-04-22 PROCEDURE — 3075F SYST BP GE 130 - 139MM HG: CPT | Mod: CPTII,,, | Performed by: NURSE PRACTITIONER

## 2025-04-22 PROCEDURE — 3078F DIAST BP <80 MM HG: CPT | Mod: CPTII,,, | Performed by: NURSE PRACTITIONER

## 2025-04-22 PROCEDURE — 1160F RVW MEDS BY RX/DR IN RCRD: CPT | Mod: CPTII,,, | Performed by: NURSE PRACTITIONER

## 2025-04-22 RX ORDER — ATORVASTATIN CALCIUM 80 MG/1
80 TABLET, FILM COATED ORAL DAILY
Qty: 90 TABLET | Refills: 3 | Status: SHIPPED | OUTPATIENT
Start: 2025-04-22 | End: 2026-04-22

## 2025-04-22 RX ORDER — METOPROLOL SUCCINATE 25 MG/1
25 TABLET, EXTENDED RELEASE ORAL EVERY MORNING
Qty: 90 TABLET | Refills: 3 | Status: SHIPPED | OUTPATIENT
Start: 2025-04-22 | End: 2026-04-22

## 2025-04-22 RX ORDER — LISINOPRIL 10 MG/1
10 TABLET ORAL DAILY
Qty: 90 TABLET | Refills: 3 | Status: SHIPPED | OUTPATIENT
Start: 2025-04-22 | End: 2026-04-22

## 2025-04-22 NOTE — PROGRESS NOTES
[facility] Cardiology  Established Patient Visit      CHIEF COMPLAINT:   Chief Complaint   Patient presents with    Follow-up     6 mos f/u   Denies cardiac targets                   Review of patient's allergies indicates:  No Known Allergies                                 HPI:  This is a 63-year-old  male with CAD s/p PCI to LAD in 2021 in [facility],   diabetes mellitus, hypertension, HLD, adrenal adenoma status post resection in 2020, and tobacco use who presents for routine follow up and ongoing care.     Previous ischemic evaluation includes an abnormal Lexiscan stress test on 6.23.21 which revealed a medium-sized area of mild inferior ischemia. Left heart catheterization performed on 8.11.21 showed two-vessel disease, with 70% proximal LAD and proximal RCA lesions.  He underwent subsequent LAD stent performed at [facility] in Decemeber 2021.    Today the patient reports that he feels good overall.  He endorses stable shortness of breath with exertion that has not progressed or worsened since last clinic visit. He appears euvolemic on exam without any overt signs or symptoms of volume overload.  The patient denies any further cardiac complaints of chest pain, palpitations, orthopnea, PND, peripheral edema, claudication, lightheadedness, dizziness, near-syncope, syncope or falls.   He is able to complete his ADLs and heavy housework including vacuuming without experiencing any anginal/anginal equivalent symptoms. Otherwise he does not participate in any other exercise regimen.      CARDIAC TESTING:  Echocardiogram April 22, 2021:  Ventricular ejection fraction is measured at approximately 55 to 60%.  Structurally normal mitral valve.  Individual aortic valve leaflets are not clearly visualized  Normal flow velocities across aortic valve.  Trace tricuspid regurgitation with an RVSP of 27 mmHg.  No evidence of pulmonic regurgitation.  Normal size left atrium.  Borderline dilated right atrium.  Mildly enlarged right  ventricle.  No evidence of a pericardial effusion.  No evidence of pleural effusion.  The IVC is normal.                                                                                                                                                                                                                                                                                                                                                                                                                                                              Patient Active Problem List   Diagnosis    Coronary artery disease involving native coronary artery of native heart without angina pectoris    HTN (hypertension), benign    Hyperlipidemia LDL goal <70    History of cigar smoking    Chronic obstructive pulmonary disease    Type 2 diabetes mellitus without complication, with long-term current use of insulin     Past Surgical History:   Procedure Laterality Date    ADRENALECTOMY      ANGIOGRAM, CORONARY, WITH LEFT HEART CATHETERIZATION      FLUOROSCOPY OF LEFT HEART      HERNIA REPAIR       Social History     Socioeconomic History    Marital status: Single   Tobacco Use    Smoking status: Every Day     Types: Cigars    Smokeless tobacco: Never    Tobacco comments:     3 cigars a day   Substance and Sexual Activity    Alcohol use: Not Currently    Drug use: Yes     Frequency: 7.0 times per week     Types: Marijuana     Comment: Patient states he smokes Marijuana once daily    Sexual activity: Not Currently     Birth control/protection: Abstinence     Social Drivers of Health     Financial Resource Strain: Medium Risk (5/12/2022)    Overall Financial Resource Strain (CARDIA)     Difficulty of Paying Living Expenses: Somewhat hard   Food Insecurity: Food Insecurity Present (5/12/2022)    Hunger Vital Sign     Worried About Running Out of Food in the Last Year: Sometimes true     Ran Out of Food in the Last Year: Never true    Transportation Needs: No Transportation Needs (5/12/2022)    PRAPARE - Transportation     Lack of Transportation (Medical): No     Lack of Transportation (Non-Medical): No   Physical Activity: Inactive (7/27/2022)    Exercise Vital Sign     Days of Exercise per Week: 0 days     Minutes of Exercise per Session: 0 min   Stress: No Stress Concern Present (7/27/2022)    [employer] [place] [employer] - Occupational Stress Questionnaire     Feeling of Stress : Not at all   Housing Stability: Low Risk  (5/12/2022)    Housing Stability Vital Sign     Unable to Pay for Housing in the Last Year: No     Number of Places Lived in the Last Year: 1     Unstable Housing in the Last Year: No        Family History   Problem Relation Name Age of Onset    Arthritis Father Dillon gong     Diabetes Father Dillon gong     Hypertension Father Dillon gong     Cancer Maternal Grandmother Aurea arce     Asthma Sister Jessica justin     Depression Sister Jessica justin     Cancer Maternal Aunt Tatiana bro     Depression Sister Brittni rivera          Current Outpatient Medications:     albuterol (PROVENTIL) 2.5 mg /3 mL (0.083 %) nebulizer solution, Inhale 2.5 mg into the lungs continuous prn., Disp: , Rfl:     aspirin (ECOTRIN) 81 MG EC tablet, Take 1 tablet (81 mg total) by mouth every morning., Disp: 90 tablet, Rfl: 3    buPROPion (WELLBUTRIN XL) 150 MG TB24 tablet, Take 150 mg by mouth once daily., Disp: , Rfl:     buPROPion (WELLBUTRIN XL) 150 MG TB24 tablet, Take 1 tablet by mouth every evening., Disp: , Rfl:     ergocalciferol (ERGOCALCIFEROL) 50,000 unit Cap, Take 50,000 Units by mouth., Disp: , Rfl:     EScitalopram oxalate (LEXAPRO) 20 MG tablet, Take 20 mg by mouth once daily., Disp: , Rfl:     EScitalopram oxalate (LEXAPRO) 20 MG tablet, Take 1 tablet by mouth every morning., Disp: , Rfl:     metFORMIN (GLUCOPHAGE) 500 MG tablet, TAKE 1 TABLET (500 MG TOTAL) BY MOUTH 2 (TWO) TIMES DAILY WITH MEALS., Disp: 56  tablet, Rfl: 2    sildenafiL (VIAGRA) 25 MG tablet, Take 1 tablet (25 mg total) by mouth daily as needed for Erectile Dysfunction., Disp: 10 tablet, Rfl: 2    traZODone (DESYREL) 100 MG tablet, Take 200 mg by mouth once daily., Disp: , Rfl:     traZODone (DESYREL) 100 MG tablet, Take 1 tablet by mouth every evening., Disp: , Rfl:     VITAMIN D2 1,250 mcg (50,000 unit) capsule, TAKE 1 CAPSULE BY MOUTH EVERY WEEK, Disp: 4 capsule, Rfl: 4    atorvastatin (LIPITOR) 80 MG tablet, Take 1 tablet (80 mg total) by mouth once daily., Disp: 90 tablet, Rfl: 3    fluticasone-salmeterol diskus inhaler 250-50 mcg, Inhale 1 puff into the lungs once daily., Disp: 90 each, Rfl: 3    INVEGA 9 mg TR24, Take 9 mg by mouth once daily. (Patient not taking: Reported on 4/22/2025), Disp: , Rfl:     lisinopriL 10 MG tablet, Take 1 tablet (10 mg total) by mouth once daily., Disp: 90 tablet, Rfl: 3    metoprolol succinate (TOPROL-XL) 25 MG 24 hr tablet, Take 1 tablet (25 mg total) by mouth every morning., Disp: 90 tablet, Rfl: 3    omeprazole (PRILOSEC) 40 MG capsule, Take 1 capsule (40 mg total) by mouth once daily. (Patient not taking: Reported on 4/22/2025), Disp: 30 capsule, Rfl: 2    paliperidone (INVEGA) 3 MG TR24, Take 3 mg by mouth once daily. (Patient not taking: Reported on 4/22/2025), Disp: , Rfl:     paliperidone (INVEGA) 3 MG TR24, Take 1 tablet by mouth every morning. (Patient not taking: Reported on 4/22/2025), Disp: , Rfl:     paliperidone (INVEGA) 9 MG TR24, Take 1 tablet by mouth every morning. (Patient not taking: Reported on 4/22/2025), Disp: , Rfl:      ROS:                                                                                                                                                                             Review of Systems   Constitutional: Negative.    HENT: Negative.     Eyes: Negative.    Respiratory:  Positive for shortness of breath.    Cardiovascular: Negative.    Gastrointestinal: Negative.   "  Genitourinary: Negative.    Musculoskeletal: Negative.    Skin: Negative.    Neurological: Negative.    Endo/Heme/Allergies: Negative.    Psychiatric/Behavioral: Negative.          Blood pressure (!) 127/98, pulse (!) 115, temperature 98.8 °F (37.1 °C), temperature source Oral, resp. rate 18, height 5' 6" (1.676 m), weight 74.4 kg (164 lb), SpO2 95%.   PE:  General: alert and oriented/no acute distress  Eye: EOMI/normal conjunctiva/no xanthelasma  HENT: normocephalic/moist oral mucosa  Neck: supple/nontender/no carotid bruit  Respiratory: lungs CTA/nonlabored respirations/BS equal/symmetrical expansion/no  chest wall tenderness  Cardiovascular: normal rate/normal rhythm/no murmur/normal peripheral perfusion/no  edema/no JVD  Gastrointestinal: soft/nontender  Musculoskeletal: normal ROM  Integumentary: warm/dry/pink/intact  Neurologic: alert/oriented/normal sensory/no focal deficits  Psychiatric: cooperative/appropriate mood and affect/normal judgment       ASSESSMENT/PLAN:  CAD S/P KYLIE to LAD at [facility]  ([facility]- Dec. 2021)  - LVEF-55-60% per ECHO 4.22.21  - Denies CP, SOB  - Continue aspirin, metoprolol succinate, and atorvastatin. Medication Refills provided   - Counseled on heart healtly, low cholesterol diet and activity as directed   - EKG today     Hypertension  - BP -130/78  - Continue Lisinopril 10 mg.  Refill provided  - Nurse visit in 2-3 weeks for BP check. Instructed the patient to bring all medications  - Advised on low sodium diet and increased exercise as tolerated       Diabetes mellitus  - A1C- 5.9 in 2021  - Management per PCP.  The patient requesting to reestablish care with a PCP.  Will send referral to Internal Medicine       HLD  - LDL at goal-60 in 2022 but more recently 98 in 3/2024  - Continue Atorvastatin 80mg daily. Dose increase at last clinic visit.  It appears the patient has intermittent compliance with his medications as well.  - Advised on low-cholesterol, low-fat diet and exercise " as tolerated   - Repeat CMP/FLP      Tobacco Use  - Quit smoking cigarettes >1 yr ago but is currently smoking several cigars a day   - Counseled the patient on the importance of complete smoking cessation.    COPD  - Continue management per PCP    EKG  CMP/FLP today   Nurse visit in 2-3 weeks for BP/HR check. Please Bring all Medications  Follow up in Cardiology Clinic in 8 months or sooner if needed   Referral to IM to reestablish care

## 2025-04-22 NOTE — PATIENT INSTRUCTIONS
EKG  CMP/FLP today   Nurse visit in 2-3 weeks for BP/HR check. Please Bring all Medications  Follow up in Cardiology Clinic in 8 months or sooner if needed   Referral to IM to reestablish care

## 2025-04-24 LAB
OHS QRS DURATION: 78 MS
OHS QTC CALCULATION: 427 MS

## 2025-06-08 RX ORDER — METFORMIN HYDROCHLORIDE 500 MG/1
500 TABLET ORAL 2 TIMES DAILY WITH MEALS
Qty: 56 TABLET | Refills: 2 | Status: SHIPPED | OUTPATIENT
Start: 2025-06-08 | End: 2025-06-08 | Stop reason: SDUPTHER

## 2025-06-08 RX ORDER — METFORMIN HYDROCHLORIDE 500 MG/1
500 TABLET ORAL 2 TIMES DAILY WITH MEALS
Qty: 56 TABLET | Refills: 2 | Status: SHIPPED | OUTPATIENT
Start: 2025-06-08 | End: 2026-06-08

## 2025-07-31 ENCOUNTER — OFFICE VISIT (OUTPATIENT)
Dept: CARDIOLOGY | Facility: CLINIC | Age: 65
End: 2025-07-31
Payer: MEDICAID

## 2025-07-31 VITALS
HEART RATE: 110 BPM | OXYGEN SATURATION: 95 % | TEMPERATURE: 98 F | HEIGHT: 66 IN | BODY MASS INDEX: 25.9 KG/M2 | RESPIRATION RATE: 18 BRPM | SYSTOLIC BLOOD PRESSURE: 142 MMHG | DIASTOLIC BLOOD PRESSURE: 90 MMHG | WEIGHT: 161.19 LBS

## 2025-07-31 DIAGNOSIS — Z79.4 TYPE 2 DIABETES MELLITUS WITHOUT COMPLICATION, WITH LONG-TERM CURRENT USE OF INSULIN: ICD-10-CM

## 2025-07-31 DIAGNOSIS — R06.09 DOE (DYSPNEA ON EXERTION): ICD-10-CM

## 2025-07-31 DIAGNOSIS — E11.9 TYPE 2 DIABETES MELLITUS WITHOUT COMPLICATION, WITH LONG-TERM CURRENT USE OF INSULIN: ICD-10-CM

## 2025-07-31 DIAGNOSIS — I25.10 CORONARY ARTERY DISEASE INVOLVING NATIVE CORONARY ARTERY OF NATIVE HEART WITHOUT ANGINA PECTORIS: Primary | ICD-10-CM

## 2025-07-31 DIAGNOSIS — J42 CHRONIC BRONCHITIS, UNSPECIFIED CHRONIC BRONCHITIS TYPE: ICD-10-CM

## 2025-07-31 DIAGNOSIS — I10 HTN (HYPERTENSION), BENIGN: ICD-10-CM

## 2025-07-31 DIAGNOSIS — E78.5 HYPERLIPIDEMIA LDL GOAL <70: ICD-10-CM

## 2025-07-31 DIAGNOSIS — R00.0 SINUS TACHYCARDIA: ICD-10-CM

## 2025-07-31 DIAGNOSIS — Z72.0 TOBACCO USE: ICD-10-CM

## 2025-07-31 PROCEDURE — 3077F SYST BP >= 140 MM HG: CPT | Mod: CPTII,,, | Performed by: NURSE PRACTITIONER

## 2025-07-31 PROCEDURE — 1160F RVW MEDS BY RX/DR IN RCRD: CPT | Mod: CPTII,,, | Performed by: NURSE PRACTITIONER

## 2025-07-31 PROCEDURE — 3008F BODY MASS INDEX DOCD: CPT | Mod: CPTII,,, | Performed by: NURSE PRACTITIONER

## 2025-07-31 PROCEDURE — 93005 ELECTROCARDIOGRAM TRACING: CPT

## 2025-07-31 PROCEDURE — 4010F ACE/ARB THERAPY RXD/TAKEN: CPT | Mod: CPTII,,, | Performed by: NURSE PRACTITIONER

## 2025-07-31 PROCEDURE — 99214 OFFICE O/P EST MOD 30 MIN: CPT | Mod: S$PBB,,, | Performed by: NURSE PRACTITIONER

## 2025-07-31 PROCEDURE — 99215 OFFICE O/P EST HI 40 MIN: CPT | Mod: PBBFAC | Performed by: NURSE PRACTITIONER

## 2025-07-31 PROCEDURE — 3080F DIAST BP >= 90 MM HG: CPT | Mod: CPTII,,, | Performed by: NURSE PRACTITIONER

## 2025-07-31 PROCEDURE — 1159F MED LIST DOCD IN RCRD: CPT | Mod: CPTII,,, | Performed by: NURSE PRACTITIONER

## 2025-07-31 RX ORDER — METOPROLOL SUCCINATE 50 MG/1
50 TABLET, EXTENDED RELEASE ORAL DAILY
Qty: 90 TABLET | Refills: 3 | Status: SHIPPED | OUTPATIENT
Start: 2025-07-31 | End: 2026-07-31

## 2025-07-31 NOTE — PROGRESS NOTES
[facility] Cardiology  Established Patient Visit      CHIEF COMPLAINT:   Chief Complaint   Patient presents with    Follow-up     Denies any cardiac problems.                   Review of patient's allergies indicates:  No Known Allergies                                 HPI:  This is a 63-year-old  male with CAD s/p PCI to LAD in 2021 in [facility], COPD,  diabetes mellitus, hypertension, HLD, adrenal adenoma status post resection in 2020, and tobacco use who presents to cardiology clinic for routine follow up and ongoing care.     Latest Echocardiogram obtained on 4.22.21 demonstrated estimated ejection fraction 55-60%, and no significant valvular abnormalities  Previous ischemic evaluation includes an abnormal Lexiscan stress test on 6.23.21 which revealed a medium-sized area of mild inferior ischemia. Left heart catheterization performed on 8.11.21 showed two-vessel disease, with 70% proximal LAD and proximal RCA lesions.  He underwent subsequent LAD stent performed at [facility] in Decemeber 2021.    Today the patient endorses ongoing yet stable exertional dyspnea and fatigue.  He denies any worsening or progression of symptoms, but states that he does not feel motivated to further exerts himself.  The patient is able to complete his basic ADLs and laundry without experiencing any anginal or significant anginal equivalent symptoms.  He denies any further cardiac complaints of chest pain, palpitations, orthopnea, PND, peripheral edema, claudication, lightheadedness, dizziness, near-syncope, syncope or falls.  He admits to intermittent compliance with his medications, citing that he occasionally misses doses.  The patient currently smokes approximately 3-4 cigarettes a day and is not ready to quit at this time    Level of Exertion: Laundry    CARDIAC TESTING:  Echocardiogram April 22, 2021:  Left ventricular ejection fraction is measured at approximately 55 to 60%.  Structurally normal mitral valve.  Individual aortic valve  leaflets are not clearly visualized  Normal flow velocities across aortic valve.  Trace tricuspid regurgitation with an RVSP of 27 mmHg.  No evidence of pulmonic regurgitation.  Normal size left atrium.  Borderline dilated right atrium.  Mildly enlarged right ventricle.  No evidence of a pericardial effusion.  No evidence of pleural effusion.  The IVC is normal.                                                                                                                                                                                                                                                                                                                                                                                                                                                              Patient Active Problem List   Diagnosis    Coronary artery disease involving native coronary artery of native heart without angina pectoris    HTN (hypertension), benign    Hyperlipidemia LDL goal <70    History of cigar smoking    Chronic obstructive pulmonary disease    Type 2 diabetes mellitus without complication, with long-term current use of insulin     Past Surgical History:   Procedure Laterality Date    ADRENALECTOMY      ANGIOGRAM, CORONARY, WITH LEFT HEART CATHETERIZATION      FLUOROSCOPY OF LEFT HEART      HERNIA REPAIR       Social History     Socioeconomic History    Marital status: Single   Tobacco Use    Smoking status: Every Day     Types: Cigars    Smokeless tobacco: Never    Tobacco comments:     3 cigars a day   Substance and Sexual Activity    Alcohol use: Not Currently    Drug use: Yes     Frequency: 7.0 times per week     Types: Marijuana     Comment: Patient states he smokes Marijuana once daily    Sexual activity: Not Currently     Birth control/protection: Abstinence     Social Drivers of Health     Financial Resource Strain: Medium Risk (5/12/2022)    Overall Financial Resource Strain (CARDIA)      Difficulty of Paying Living Expenses: Somewhat hard   Food Insecurity: Food Insecurity Present (5/12/2022)    Hunger Vital Sign     Worried About Running Out of Food in the Last Year: Sometimes true     Ran Out of Food in the Last Year: Never true   Transportation Needs: No Transportation Needs (5/12/2022)    PRAPARE - Transportation     Lack of Transportation (Medical): No     Lack of Transportation (Non-Medical): No   Physical Activity: Inactive (7/27/2022)    Exercise Vital Sign     Days of Exercise per Week: 0 days     Minutes of Exercise per Session: 0 min   Stress: No Stress Concern Present (7/27/2022)    [employer] - Occupational Stress Questionnaire     Feeling of Stress : Not at all   Housing Stability: Low Risk  (5/12/2022)    Housing Stability Vital Sign     Unable to Pay for Housing in the Last Year: No     Number of Places Lived in the Last Year: 1     Unstable Housing in the Last Year: No        Family History   Problem Relation Name Age of Onset    Arthritis Father Dillon gong     Diabetes Father Dillon gong     Hypertension Father Dillon gong     Cancer Maternal Grandmother Aurea yazmin     Asthma Sister Jessica justin     Depression Sister Jessica justin     Cancer Maternal Aunt Tatiana bro     Depression Sister Brittni rivera          Current Outpatient Medications:     albuterol (PROVENTIL) 2.5 mg /3 mL (0.083 %) nebulizer solution, Inhale 2.5 mg into the lungs continuous prn., Disp: , Rfl:     aspirin (ECOTRIN) 81 MG EC tablet, Take 1 tablet (81 mg total) by mouth every morning., Disp: 90 tablet, Rfl: 3    atorvastatin (LIPITOR) 80 MG tablet, Take 1 tablet (80 mg total) by mouth once daily., Disp: 90 tablet, Rfl: 3    buPROPion (WELLBUTRIN XL) 150 MG TB24 tablet, Take 150 mg by mouth once daily., Disp: , Rfl:     EScitalopram oxalate (LEXAPRO) 20 MG tablet, Take 20 mg by mouth once daily., Disp: , Rfl:     lisinopriL 10 MG tablet, Take 1 tablet (10 mg total)  by mouth once daily., Disp: 90 tablet, Rfl: 3    metFORMIN (GLUCOPHAGE) 500 MG tablet, Take 1 tablet (500 mg total) by mouth 2 (two) times daily with meals., Disp: 56 tablet, Rfl: 2    metoprolol succinate (TOPROL-XL) 25 MG 24 hr tablet, Take 1 tablet (25 mg total) by mouth every morning., Disp: 90 tablet, Rfl: 3    paliperidone (INVEGA) 3 MG TR24, Take 1 tablet by mouth every morning., Disp: , Rfl:     paliperidone (INVEGA) 9 MG TR24, Take 1 tablet by mouth every morning., Disp: , Rfl:     traZODone (DESYREL) 100 MG tablet, Take 1 tablet by mouth every evening., Disp: , Rfl:     VITAMIN D2 1,250 mcg (50,000 unit) capsule, TAKE 1 CAPSULE BY MOUTH EVERY WEEK, Disp: 4 capsule, Rfl: 4    buPROPion (WELLBUTRIN XL) 150 MG TB24 tablet, Take 1 tablet by mouth every evening., Disp: , Rfl:     ergocalciferol (ERGOCALCIFEROL) 50,000 unit Cap, Take 50,000 Units by mouth. (Patient not taking: Reported on 7/31/2025), Disp: , Rfl:     EScitalopram oxalate (LEXAPRO) 20 MG tablet, Take 1 tablet by mouth every morning., Disp: , Rfl:     fluticasone-salmeterol diskus inhaler 250-50 mcg, Inhale 1 puff into the lungs once daily. (Patient not taking: Reported on 7/31/2025), Disp: 90 each, Rfl: 3    INVEGA 9 mg TR24, Take 9 mg by mouth once daily. (Patient not taking: Reported on 4/18/2024), Disp: , Rfl:     omeprazole (PRILOSEC) 40 MG capsule, Take 1 capsule (40 mg total) by mouth once daily. (Patient not taking: Reported on 4/18/2024), Disp: 30 capsule, Rfl: 2    paliperidone (INVEGA) 3 MG TR24, Take 3 mg by mouth once daily. (Patient not taking: Reported on 4/22/2025), Disp: , Rfl:     sildenafiL (VIAGRA) 25 MG tablet, Take 1 tablet (25 mg total) by mouth daily as needed for Erectile Dysfunction. (Patient not taking: Reported on 7/31/2025), Disp: 10 tablet, Rfl: 2    traZODone (DESYREL) 100 MG tablet, Take 200 mg by mouth once daily., Disp: , Rfl:      ROS:                                                                                 "                                                                                             Review of Systems   Constitutional: Negative.    HENT: Negative.     Eyes: Negative.    Respiratory:  Positive for shortness of breath.    Cardiovascular:  Negative for chest pain, palpitations, orthopnea, claudication, leg swelling and PND.   Gastrointestinal: Negative.    Genitourinary: Negative.    Musculoskeletal: Negative.    Skin: Negative.    Neurological: Negative.    Endo/Heme/Allergies: Negative.    Psychiatric/Behavioral: Negative.          Blood pressure (!) 144/96, pulse 110, temperature 97.8 °F (36.6 °C), temperature source Oral, resp. rate 18, height 5' 6" (1.676 m), weight 73.1 kg (161 lb 3.2 oz), SpO2 95%.   PE:  General: alert and oriented/no acute distress  Eye: EOMI/normal conjunctiva/no xanthelasma  HENT: normocephalic/moist oral mucosa  Neck: supple/nontender/no carotid bruit  Respiratory: lungs CTA/nonlabored respirations/BS equal/symmetrical expansion/no  chest wall tenderness  Cardiovascular: normal rate/normal rhythm/no murmur/normal peripheral perfusion/no  edema/no JVD  Gastrointestinal: soft/nontender  Musculoskeletal: normal ROM  Integumentary: warm/dry/pink/intact  Neurologic: alert/oriented/normal sensory/no focal deficits  Psychiatric: cooperative/appropriate mood and affect/normal judgment       ASSESSMENT/PLAN:  SOLANO   CAD S/P KYLIE to LAD at [facility]  ([facility]- Dec. 2021)  - LVEF-55-60% per ECHO 4.22.21  - Reports ongoing shortness of breath with exertion.  The patient states he is able to complete his ADLs and light activities without shortness of breath but does experience exertional dyspnea with other activities.  Will obtain updated Echocardiogram to reassess LV function and check for any significant valvular abnormalities given exertional dyspnea. EKG today demonstrated sinus tachycardia with PVCs. HR-115 BPM.  Will increase metoprolol succinate to 50 mg for better rate control.  Reiterated " with the patient's importance of medication compliance as well.  The patient states that he has taken his metoprolol consistently over the past week or so.   - Schedule Nurse visit in 2 weeks for BP/HR check.  Will also obtain a 48 hour Holter monitor to ensure adequate rate control and assess for any other arrhythmias  - Continue aspirin, metoprolol succinate 50 mg (increased dose) and atorvastatin. Medication Refills provided   - Counseled on heart healtly, low cholesterol diet and activity as directed       Hypertension  - BP above goal.  However the patient admits to intermittent compliance with medications  - Continue Lisinopril 10 mg  - Schedule NV in 2-3 weeks for BP check. Patient to bring all medications for verification.  If BP above goal with medications will plan to increase lisinopril 20 mg  - Advised on low sodium diet and increased exercise as tolerated       Diabetes mellitus  - A1C- 5.9 in 2021  - Management per PCP.  Referral previously placed to Internal Medicine to reestablish care.  However, it appears patient has yet to receive an appointment. Will place another referral today      HLD  - LDL at goal-60 in 2022 but more recently 98 in 3/2024  - Continue Atorvastatin 80mg daily. Dose increased at last clinic visit.  It appears the patient has intermittent compliance with his medications as well.  - Advised on low-cholesterol, low-fat diet and exercise as tolerated   - Repeat CMP/FLP prior to next visit       Tobacco Use  - Quit smoking cigarettes >1 yr ago but is currently smoking several cigars a day and is not ready to quit  - Counseled the patient on the importance of complete smoking cessation.    COPD  - Continue management per PCP.  Referral placed to reestablish care      EKG   ECHO  Increase Metoprolol succinate to 50  48 Hour Holter Monitor   Nurse visit in 2-3 weeks for BP/HR check. Please Bring all Medications  Follow up in Cardiology Clinic in 3 months with CMP/FLP months or sooner  if needed   Referral to IM to re- establish care

## 2025-07-31 NOTE — PATIENT INSTRUCTIONS
EKG   ECHO  Increase Metoprolol succinate to 50  48 Hour Holter Monitor   Nurse visit in 2-3 weeks for BP/HR check. Please Bring all Medications  Follow up in Cardiology Clinic in 3 months with CMP/FLP months or sooner if needed   Referral to IM to re- establish care

## 2025-08-01 DIAGNOSIS — F25.0 SCHIZOAFFECTIVE DISORDER, BIPOLAR TYPE: ICD-10-CM

## 2025-08-01 RX ORDER — ERGOCALCIFEROL 1.25 MG/1
50000 CAPSULE ORAL
Qty: 4 CAPSULE | Refills: 4 | Status: SHIPPED | OUTPATIENT
Start: 2025-08-01

## 2025-08-02 LAB
OHS QRS DURATION: 78 MS
OHS QTC CALCULATION: 434 MS

## 2025-08-11 ENCOUNTER — HOSPITAL ENCOUNTER (OUTPATIENT)
Dept: CARDIOLOGY | Facility: HOSPITAL | Age: 65
Discharge: HOME | End: 2025-08-11
Attending: NURSE PRACTITIONER
Payer: MEDICAID

## 2025-08-11 DIAGNOSIS — R00.0 SINUS TACHYCARDIA: ICD-10-CM

## 2025-08-11 PROCEDURE — 93225 XTRNL ECG REC<48 HRS REC: CPT

## 2025-08-15 LAB
OHS CV EVENT MONITOR DAY: 0
OHS CV HOLTER LENGTH DECIMAL HOURS: 48
OHS CV HOLTER LENGTH HOURS: 48
OHS CV HOLTER LENGTH MINUTES: 0
OHS CV HOLTER SINUS AVERAGE HR: 84
OHS CV HOLTER SINUS MAX HR: 149

## 2025-08-19 ENCOUNTER — TELEPHONE (OUTPATIENT)
Dept: CARDIOLOGY | Facility: CLINIC | Age: 65
End: 2025-08-19
Payer: MEDICAID

## 2025-08-21 ENCOUNTER — TELEPHONE (OUTPATIENT)
Dept: CARDIOLOGY | Facility: CLINIC | Age: 65
End: 2025-08-21
Payer: MEDICAID

## 2025-08-21 ENCOUNTER — HOSPITAL ENCOUNTER (OUTPATIENT)
Dept: CARDIOLOGY | Facility: HOSPITAL | Age: 65
Discharge: HOME | End: 2025-08-21
Attending: NURSE PRACTITIONER
Payer: MEDICAID

## 2025-08-21 VITALS
BODY MASS INDEX: 25.88 KG/M2 | WEIGHT: 161 LBS | HEIGHT: 66 IN | SYSTOLIC BLOOD PRESSURE: 142 MMHG | DIASTOLIC BLOOD PRESSURE: 95 MMHG

## 2025-08-21 DIAGNOSIS — R06.09 DOE (DYSPNEA ON EXERTION): ICD-10-CM

## 2025-08-21 LAB
APICAL FOUR CHAMBER EJECTION FRACTION: 55 %
APICAL TWO CHAMBER EJECTION FRACTION: 57 %
AV INDEX (PROSTH): 0.71
AV MEAN GRADIENT: 3 MMHG
AV PEAK GRADIENT: 7 MMHG
AV VALVE AREA BY VELOCITY RATIO: 1.9 CM²
AV VALVE AREA: 2.2 CM²
AV VELOCITY RATIO: 0.62
BSA FOR ECHO PROCEDURE: 1.84 M2
CV ECHO LV RWT: 0.7 CM
DOP CALC AO PEAK VEL: 1.3 M/S
DOP CALC AO VTI: 19.4 CM
DOP CALC LVOT AREA: 3.1 CM2
DOP CALC LVOT DIAMETER: 2 CM
DOP CALC LVOT PEAK VEL: 0.8 M/S
DOP CALC MV VTI: 22.6 CM
DOP CALCLVOT PEAK VEL VTI: 13.7 CM
E WAVE DECELERATION TIME: 173 MSEC
E/A RATIO: 0.66
E/E' RATIO: 15 M/S
ECHO LV POSTERIOR WALL: 1.3 CM (ref 0.6–1.1)
FRACTIONAL SHORTENING: 29.7 % (ref 28–44)
INTERVENTRICULAR SEPTUM: 1.6 CM (ref 0.6–1.1)
LEFT ATRIUM SIZE: 3.3 CM
LEFT INTERNAL DIMENSION IN SYSTOLE: 2.6 CM (ref 2.1–4)
LEFT VENTRICLE DIASTOLIC VOLUME INDEX: 32.42 ML/M2
LEFT VENTRICLE DIASTOLIC VOLUME: 59 ML
LEFT VENTRICLE END DIASTOLIC VOLUME APICAL 2 CHAMBER: 80.18 ML
LEFT VENTRICLE END DIASTOLIC VOLUME APICAL 4 CHAMBER INDEX BSA: 32.63 ML/M2
LEFT VENTRICLE END DIASTOLIC VOLUME APICAL 4 CHAMBER: 59.39 ML
LEFT VENTRICLE MASS INDEX: 108.6 G/M2
LEFT VENTRICLE SYSTOLIC VOLUME INDEX: 12.6 ML/M2
LEFT VENTRICLE SYSTOLIC VOLUME: 23 ML
LEFT VENTRICULAR INTERNAL DIMENSION IN DIASTOLE: 3.7 CM (ref 3.5–6)
LEFT VENTRICULAR MASS: 197.7 G
LV LATERAL E/E' RATIO: 11.8 M/S
LV SEPTAL E/E' RATIO: 19.7 M/S
LVED V (TEICH): 58.94 ML
LVES V (TEICH): 23.36 ML
LVOT MG: 1.23 MMHG
LVOT MV: 0.48 CM/S
MV MEAN GRADIENT: 1 MMHG
MV PEAK A VEL: 0.89 M/S
MV PEAK E VEL: 0.59 M/S
MV PEAK GRADIENT: 3 MMHG
MV STENOSIS PRESSURE HALF TIME: 50.02 MS
MV VALVE AREA BY CONTINUITY EQUATION: 1.9 CM2
MV VALVE AREA P 1/2 METHOD: 4.4 CM2
OHS CV CPX PATIENT HEIGHT IN: 66
OHS CV RV/LV RATIO: 1 CM
OHS LV EJECTION FRACTION SIMPSONS BIPLANE MOD: 55 %
PISA TR MAX VEL: 2.7 M/S
RA PRESSURE ESTIMATED: 8 MMHG
RIGHT VENTRICLE DIASTOLIC BASEL DIMENSION: 3.7 CM
RIGHT VENTRICULAR END-DIASTOLIC DIMENSION: 3.7 CM
RV TB RVSP: 11 MMHG
TDI LATERAL: 0.05 M/S
TDI SEPTAL: 0.03 M/S
TDI: 0.04 M/S
TR MAX PG: 29 MMHG
TRICUSPID ANNULAR PLANE SYSTOLIC EXCURSION: 1.6 CM
TV REST PULMONARY ARTERY PRESSURE: 37 MMHG
Z-SCORE OF LEFT VENTRICULAR DIMENSION IN END DIASTOLE: -3.09
Z-SCORE OF LEFT VENTRICULAR DIMENSION IN END SYSTOLE: -1.42

## 2025-08-21 PROCEDURE — 93306 TTE W/DOPPLER COMPLETE: CPT

## 2025-08-27 ENCOUNTER — CLINICAL SUPPORT (OUTPATIENT)
Dept: CARDIOLOGY | Facility: CLINIC | Age: 65
End: 2025-08-27
Payer: MEDICAID

## 2025-08-27 VITALS
OXYGEN SATURATION: 91 % | RESPIRATION RATE: 20 BRPM | HEIGHT: 66 IN | SYSTOLIC BLOOD PRESSURE: 152 MMHG | TEMPERATURE: 98 F | BODY MASS INDEX: 26.36 KG/M2 | HEART RATE: 93 BPM | WEIGHT: 164 LBS | DIASTOLIC BLOOD PRESSURE: 82 MMHG

## 2025-08-27 DIAGNOSIS — I10 HTN (HYPERTENSION), BENIGN: Primary | ICD-10-CM

## 2025-08-27 LAB
ANION GAP SERPL CALC-SCNC: 9 MEQ/L
BUN SERPL-MCNC: 12.2 MG/DL (ref 8.4–25.7)
CALCIUM SERPL-MCNC: 9.2 MG/DL (ref 8.8–10)
CHLORIDE SERPL-SCNC: 100 MMOL/L (ref 98–107)
CO2 SERPL-SCNC: 30 MMOL/L (ref 23–31)
CREAT SERPL-MCNC: 0.82 MG/DL (ref 0.72–1.25)
CREAT/UREA NIT SERPL: 15
GFR SERPLBLD CREATININE-BSD FMLA CKD-EPI: >60 ML/MIN/1.73/M2
GLUCOSE SERPL-MCNC: 96 MG/DL (ref 82–115)
POTASSIUM SERPL-SCNC: 4.2 MMOL/L (ref 3.5–5.1)
SODIUM SERPL-SCNC: 139 MMOL/L (ref 136–145)

## 2025-08-27 PROCEDURE — 80048 BASIC METABOLIC PNL TOTAL CA: CPT

## 2025-08-27 PROCEDURE — 99215 OFFICE O/P EST HI 40 MIN: CPT | Mod: PBBFAC

## 2025-08-27 PROCEDURE — 36415 COLL VENOUS BLD VENIPUNCTURE: CPT

## 2025-08-27 RX ORDER — LISINOPRIL 20 MG/1
20 TABLET ORAL DAILY
Qty: 30 TABLET | Refills: 11 | Status: SHIPPED | OUTPATIENT
Start: 2025-08-27 | End: 2026-08-27